# Patient Record
Sex: FEMALE | Race: WHITE | NOT HISPANIC OR LATINO | Employment: UNEMPLOYED | URBAN - METROPOLITAN AREA
[De-identification: names, ages, dates, MRNs, and addresses within clinical notes are randomized per-mention and may not be internally consistent; named-entity substitution may affect disease eponyms.]

---

## 2023-06-21 DIAGNOSIS — N94.6 DYSMENORRHEA: ICD-10-CM

## 2023-06-21 DIAGNOSIS — N93.9 ABNORMAL UTERINE BLEEDING (AUB): ICD-10-CM

## 2023-06-21 DIAGNOSIS — Z87.42 HISTORY OF ENDOMETRIOSIS: Primary | ICD-10-CM

## 2023-06-29 DIAGNOSIS — R31.9 HEMATURIA, UNSPECIFIED TYPE: Primary | ICD-10-CM

## 2023-07-12 DIAGNOSIS — K92.1 HEMATOCHEZIA: Primary | ICD-10-CM

## 2023-07-12 DIAGNOSIS — R19.7 DIARRHEA, UNSPECIFIED TYPE: ICD-10-CM

## 2023-07-26 ENCOUNTER — OFFICE VISIT (OUTPATIENT)
Dept: UROLOGY | Facility: CLINIC | Age: 43
End: 2023-07-26
Payer: MEDICAID

## 2023-07-26 VITALS
HEIGHT: 67 IN | TEMPERATURE: 98 F | RESPIRATION RATE: 18 BRPM | BODY MASS INDEX: 35.88 KG/M2 | DIASTOLIC BLOOD PRESSURE: 80 MMHG | HEART RATE: 73 BPM | WEIGHT: 228.63 LBS | SYSTOLIC BLOOD PRESSURE: 117 MMHG | OXYGEN SATURATION: 97 %

## 2023-07-26 DIAGNOSIS — R31.21 ASYMPTOMATIC MICROSCOPIC HEMATURIA: Primary | ICD-10-CM

## 2023-07-26 DIAGNOSIS — N39.3 STRESS INCONTINENCE: ICD-10-CM

## 2023-07-26 DIAGNOSIS — Z87.442 HISTORY OF KIDNEY STONES: ICD-10-CM

## 2023-07-26 DIAGNOSIS — N39.41 URGE INCONTINENCE: ICD-10-CM

## 2023-07-26 LAB
BILIRUB SERPL-MCNC: NORMAL MG/DL
BLOOD URINE, POC: NORMAL
COLOR, POC UA: YELLOW
GLUCOSE UR QL STRIP: NORMAL
KETONES UR QL STRIP: NORMAL
LEUKOCYTE ESTERASE URINE, POC: NORMAL
NITRITE, POC UA: NORMAL
PH, POC UA: 7
POC RESIDUAL URINE VOLUME: 35 ML (ref 0–100)
PROTEIN, POC: NORMAL
SPECIFIC GRAVITY, POC UA: 1.01
UROBILINOGEN, POC UA: 0.2

## 2023-07-26 PROCEDURE — 1159F MED LIST DOCD IN RCRD: CPT | Mod: CPTII,,, | Performed by: UROLOGY

## 2023-07-26 PROCEDURE — 3008F BODY MASS INDEX DOCD: CPT | Mod: CPTII,,, | Performed by: UROLOGY

## 2023-07-26 PROCEDURE — 99204 OFFICE O/P NEW MOD 45 MIN: CPT | Mod: S$PBB,,, | Performed by: UROLOGY

## 2023-07-26 PROCEDURE — 3079F PR MOST RECENT DIASTOLIC BLOOD PRESSURE 80-89 MM HG: ICD-10-PCS | Mod: CPTII,,, | Performed by: UROLOGY

## 2023-07-26 PROCEDURE — 99214 OFFICE O/P EST MOD 30 MIN: CPT | Mod: PBBFAC | Performed by: UROLOGY

## 2023-07-26 PROCEDURE — 3074F SYST BP LT 130 MM HG: CPT | Mod: CPTII,,, | Performed by: UROLOGY

## 2023-07-26 PROCEDURE — 51798 US URINE CAPACITY MEASURE: CPT | Mod: PBBFAC | Performed by: UROLOGY

## 2023-07-26 PROCEDURE — 3074F PR MOST RECENT SYSTOLIC BLOOD PRESSURE < 130 MM HG: ICD-10-PCS | Mod: CPTII,,, | Performed by: UROLOGY

## 2023-07-26 PROCEDURE — 3079F DIAST BP 80-89 MM HG: CPT | Mod: CPTII,,, | Performed by: UROLOGY

## 2023-07-26 PROCEDURE — 3008F PR BODY MASS INDEX (BMI) DOCUMENTED: ICD-10-PCS | Mod: CPTII,,, | Performed by: UROLOGY

## 2023-07-26 PROCEDURE — 81001 URINALYSIS AUTO W/SCOPE: CPT | Mod: PBBFAC | Performed by: UROLOGY

## 2023-07-26 PROCEDURE — 1159F PR MEDICATION LIST DOCUMENTED IN MEDICAL RECORD: ICD-10-PCS | Mod: CPTII,,, | Performed by: UROLOGY

## 2023-07-26 PROCEDURE — 1160F RVW MEDS BY RX/DR IN RCRD: CPT | Mod: CPTII,,, | Performed by: UROLOGY

## 2023-07-26 PROCEDURE — 1160F PR REVIEW ALL MEDS BY PRESCRIBER/CLIN PHARMACIST DOCUMENTED: ICD-10-PCS | Mod: CPTII,,, | Performed by: UROLOGY

## 2023-07-26 PROCEDURE — 99204 PR OFFICE/OUTPT VISIT, NEW, LEVL IV, 45-59 MIN: ICD-10-PCS | Mod: S$PBB,,, | Performed by: UROLOGY

## 2023-07-26 RX ORDER — PANTOPRAZOLE SODIUM 40 MG/1
40 TABLET, DELAYED RELEASE ORAL EVERY MORNING
COMMUNITY
Start: 2023-06-28 | End: 2024-03-13 | Stop reason: SDUPTHER

## 2023-07-26 RX ORDER — OXYBUTYNIN CHLORIDE 10 MG/1
10 TABLET, EXTENDED RELEASE ORAL DAILY
Qty: 90 TABLET | Refills: 3 | Status: SHIPPED | OUTPATIENT
Start: 2023-07-26 | End: 2024-03-06 | Stop reason: SDUPTHER

## 2023-07-26 NOTE — PROGRESS NOTES
CC:  Hematuria    HPI:  Joan Frias is a 42 y.o. female seen in consultation for hematuria.  She states she has brown urine.  She has smoked 1 1/2 to 2 packs per day for 20 years.    She also has low back pain.  She did have one episode of kidney stones when she was in her 20s which she passed on her own.  She complains of stress urinary incontinence with sneeze, laugh, cough or even walking fast.  She also has intermittent episodes of urge incontinence.    Bladder scan residual:  35 ml    Urinalysis:  Results for orders placed or performed in visit on 07/26/23   POCT URINE DIPSTICK WITH MICROSCOPE, AUTOMATED   Result Value Ref Range    Color, UA Yellow     Spec Grav UA 1.010     pH, UA 7.0     WBC, UA neg     Nitrite, UA neg     Protein, POC neg     Glucose, UA neg     Ketones, UA neg     Urobilinogen, UA 0.2     Bilirubin, POC neg     Blood, UA neg      Microscopic:  Negative      Lab Results:  Urine culture - 8 June 2023: No growth     Data Review:  Note from referring provider, Heladio Razo NP dated 27 June 2023.  Urine culture.     ROS:  All systems reviewed and are negative except as documented in HPI and/or Assessment and Plan.     Patient Active Problem List:     There is no problem list on file for this patient.       Past Medical History:  History reviewed. No pertinent past medical history.     Past Surgical History:  Past Surgical History:   Procedure Laterality Date    CYST REMOVAL          Family History:  History reviewed. No pertinent family history.     Social History:  Social History     Socioeconomic History    Marital status: Legally    Tobacco Use    Smoking status: Every Day     Current packs/day: 1.50     Types: Cigarettes    Smokeless tobacco: Never   Substance and Sexual Activity    Alcohol use: Not Currently    Drug use: Yes     Frequency: 7.0 times per week     Types: Marijuana     Comment: daily        Allergies:  Review of patient's allergies indicates:   Allergen Reactions     Pcn [penicillins] Swelling        Objective:  Vitals:    07/26/23 0848   BP: 117/80   Pulse: 73   Resp: 18   Temp: 98.1 °F (36.7 °C)     General:  Well developed, well nourished adult female in no acute distress  Abdomen: Soft, nontender, no masses  Extremities:  No clubbing, cyanosis, or edema  Neurologic:  Grossly intact  Musculoskeletal:  Normal tone    Assessment:  1. Asymptomatic microscopic hematuria  - Ambulatory referral/consult to Urology  - POCT URINE DIPSTICK WITH MICROSCOPE, AUTOMATED  - CT Abdomen Pelvis W Wo Contrast; Future    2. Stress incontinence    3. Urge incontinence  - POCT Bladder Scan  - oxybutynin (DITROPAN-XL) 10 MG 24 hr tablet; Take 1 tablet (10 mg total) by mouth once daily.  Dispense: 90 tablet; Refill: 3    4. History of kidney stones     Plan:  She needs a workup for the hematuria with a CT scan followed by a cystoscopy.  We will observe the stress incontinence for now.  Address this further at a future visit.  She was given oxybutynin for the urge incontinence.  The CT scan will evaluate for recurrence of her kidney stones.    Follow-up:    After CT for a cystoscopy.

## 2023-07-26 NOTE — PROGRESS NOTES
Pt seen by Dr. Price; CT ordered & pt given centralized scheduling information sheet; Pt instructed to return to clinic for cystoscopy; Discharge paperwork given w/pt verbalizing understanding

## 2023-09-20 ENCOUNTER — OFFICE VISIT (OUTPATIENT)
Dept: GASTROENTEROLOGY | Facility: CLINIC | Age: 43
End: 2023-09-20
Payer: MEDICAID

## 2023-09-20 VITALS
DIASTOLIC BLOOD PRESSURE: 82 MMHG | OXYGEN SATURATION: 99 % | HEIGHT: 67 IN | WEIGHT: 226.19 LBS | RESPIRATION RATE: 16 BRPM | TEMPERATURE: 98 F | BODY MASS INDEX: 35.5 KG/M2 | SYSTOLIC BLOOD PRESSURE: 115 MMHG | HEART RATE: 76 BPM

## 2023-09-20 DIAGNOSIS — Z72.0 TOBACCO USER: ICD-10-CM

## 2023-09-20 DIAGNOSIS — R10.9 RIGHT SIDED ABDOMINAL PAIN: ICD-10-CM

## 2023-09-20 DIAGNOSIS — R19.7 DIARRHEA, UNSPECIFIED TYPE: ICD-10-CM

## 2023-09-20 DIAGNOSIS — K92.1 HEMATOCHEZIA: Primary | ICD-10-CM

## 2023-09-20 PROCEDURE — 99215 OFFICE O/P EST HI 40 MIN: CPT | Mod: PBBFAC | Performed by: NURSE PRACTITIONER

## 2023-09-20 PROCEDURE — 99204 PR OFFICE/OUTPT VISIT, NEW, LEVL IV, 45-59 MIN: ICD-10-PCS | Mod: S$PBB,,, | Performed by: NURSE PRACTITIONER

## 2023-09-20 PROCEDURE — 3074F PR MOST RECENT SYSTOLIC BLOOD PRESSURE < 130 MM HG: ICD-10-PCS | Mod: CPTII,,, | Performed by: NURSE PRACTITIONER

## 2023-09-20 PROCEDURE — 3008F PR BODY MASS INDEX (BMI) DOCUMENTED: ICD-10-PCS | Mod: CPTII,,, | Performed by: NURSE PRACTITIONER

## 2023-09-20 PROCEDURE — 1160F PR REVIEW ALL MEDS BY PRESCRIBER/CLIN PHARMACIST DOCUMENTED: ICD-10-PCS | Mod: CPTII,,, | Performed by: NURSE PRACTITIONER

## 2023-09-20 PROCEDURE — 3008F BODY MASS INDEX DOCD: CPT | Mod: CPTII,,, | Performed by: NURSE PRACTITIONER

## 2023-09-20 PROCEDURE — 3079F DIAST BP 80-89 MM HG: CPT | Mod: CPTII,,, | Performed by: NURSE PRACTITIONER

## 2023-09-20 PROCEDURE — 1160F RVW MEDS BY RX/DR IN RCRD: CPT | Mod: CPTII,,, | Performed by: NURSE PRACTITIONER

## 2023-09-20 PROCEDURE — 1159F PR MEDICATION LIST DOCUMENTED IN MEDICAL RECORD: ICD-10-PCS | Mod: CPTII,,, | Performed by: NURSE PRACTITIONER

## 2023-09-20 PROCEDURE — 3074F SYST BP LT 130 MM HG: CPT | Mod: CPTII,,, | Performed by: NURSE PRACTITIONER

## 2023-09-20 PROCEDURE — 1159F MED LIST DOCD IN RCRD: CPT | Mod: CPTII,,, | Performed by: NURSE PRACTITIONER

## 2023-09-20 PROCEDURE — 3079F PR MOST RECENT DIASTOLIC BLOOD PRESSURE 80-89 MM HG: ICD-10-PCS | Mod: CPTII,,, | Performed by: NURSE PRACTITIONER

## 2023-09-20 PROCEDURE — 99204 OFFICE O/P NEW MOD 45 MIN: CPT | Mod: S$PBB,,, | Performed by: NURSE PRACTITIONER

## 2023-09-20 RX ORDER — HYOSCYAMINE SULFATE 0.125 MG
125 TABLET ORAL EVERY 6 HOURS PRN
Qty: 30 TABLET | Refills: 2 | Status: SHIPPED | OUTPATIENT
Start: 2023-09-20 | End: 2023-09-21 | Stop reason: SDUPTHER

## 2023-09-20 RX ORDER — POLYETHYLENE GLYCOL 3350, SODIUM SULFATE, SODIUM CHLORIDE, POTASSIUM CHLORIDE, SODIUM ASCORBATE, AND ASCORBIC ACID 7.5-2.691G
2000 KIT ORAL ONCE
Qty: 1 KIT | Refills: 0 | Status: SHIPPED | OUTPATIENT
Start: 2023-09-20 | End: 2023-09-20

## 2023-09-20 NOTE — PROGRESS NOTES
Subjective:       Patient ID: Joan Frias is a 42 y.o. female.    Chief Complaint: Diarrhea (5 times or more daily), Abdominal Pain (Right side pain when having what she calls a flare up), and Hematochezia (With every BM during her flare up)    This 42-year-old  female with a history of tobacco use, endometriosis, and fibromyalgia is referred for hematochezia.  She presents accompanied by her mother.  She reports intermittent right-sided abdominal pain for the past 5-6 months described as burning and aching pain that is nonradiating.  The pain is worsened with eating rice, potatoes, Chinese food, and other spicy and acidic foods.  She denies specific relieving factors.  The pain will last hours to days before resolving.  She has associated nausea without vomiting.  She also has associated loose to watery stools up to 5-6 times per day with occasional red blood with bowel movements noted on the tissue after wiping and in the toilet bowl.  She reports eating small amounts of food and changing her diet over the past few months with decreased frequency of abdominal pain and rectal bleeding noted since that time.  She denies any specific changes in diet or medications prior to onset of symptoms.  She reports a longstanding history of intermittent hemorrhoidal flares over the past several years.  She also reports a longstanding history of acid reflux that has improved with pantoprazole 40 mg daily which was prescribed approximately 3 months ago.  Her appetite is decreased and she reports an unintentional weight loss of approximately 30 lb over the past 3-4 months.  She denies fever, chills, vomiting, hematemesis, odynophagia, dysphagia, early satiety, melena, fecal urgency, fecal incontinence, or pain with defecation.      Laboratory results July 3, 2023 revealed negative H pylori IgG antibody; unremarkable lipase and amylase; unremarkable CMP; unremarkable CBC; unremarkable urine culture; unremarkable TSH and T4;  unremarkable hemoglobin A1c.      She is following Urology and has a cystoscopy scheduled October 5, 2023 for reports of urge incontinence over the past few months.  CT scan abdomen and pelvis with contrast was ordered in July 2023 by urology but has not been scheduled per review of patient's chart.  She denies hematuria or vaginal bleeding.    She denies ever having an EGD or colonoscopy done.  She denies regular NSAID use or use of blood thinners.  She smokes 20-30 cigarettes daily and denies alcohol use.  She denies illicit drug use.  She denies a family history of colon polyps or colon cancer.  Her paternal uncle has a history of ulcerative colitis.      Review of patient's allergies indicates:   Allergen Reactions    Pcn [penicillins] Swelling     Past Medical History:   Diagnosis Date    Endometriosis, unspecified     Fibromyalgia      Past Surgical History:   Procedure Laterality Date    ABSCESS DRAINAGE  2019    CYST REMOVAL       Family History:   family history includes COPD in her paternal grandmother; Cystic fibrosis in her paternal grandmother; Diabetes in her paternal grandfather; Heart attack in her maternal grandfather and paternal grandfather; Hyperlipidemia in her mother; Hypertension in her mother; Ulcerative colitis in her paternal uncle.    Social History:    reports that she has been smoking cigarettes. She started smoking about 25 years ago. She has a 38.4 pack-year smoking history. She has never used smokeless tobacco. She reports that she does not currently use alcohol. She reports that she does not currently use drugs after having used the following drugs: Marijuana. Frequency: 7.00 times per week.    Review of Systems  Negative except as noted in the HPI.      Objective:      Physical Exam  Constitutional:       Appearance: Normal appearance.   HENT:      Head: Normocephalic.      Mouth/Throat:      Mouth: Mucous membranes are moist.   Eyes:      Extraocular Movements: Extraocular  movements intact.      Conjunctiva/sclera: Conjunctivae normal.      Pupils: Pupils are equal, round, and reactive to light.   Cardiovascular:      Rate and Rhythm: Normal rate and regular rhythm.      Pulses: Normal pulses.      Heart sounds: Normal heart sounds.   Pulmonary:      Effort: Pulmonary effort is normal.      Breath sounds: Normal breath sounds.   Abdominal:      General: Bowel sounds are normal.      Palpations: Abdomen is soft.      Comments: Moderate tenderness noted along right side of abdominal region with deep palpation, mild guarding noted, no rebound tenderness noted   Musculoskeletal:         General: Normal range of motion.      Cervical back: Normal range of motion and neck supple.   Skin:     General: Skin is warm and dry.   Neurological:      General: No focal deficit present.      Mental Status: She is alert and oriented to person, place, and time.   Psychiatric:         Mood and Affect: Mood normal.         Behavior: Behavior normal.         Thought Content: Thought content normal.         Judgment: Judgment normal.         Home Medications:     Current Outpatient Medications   Medication Sig    pantoprazole (PROTONIX) 40 MG tablet Take 40 mg by mouth every morning.    oxybutynin (DITROPAN-XL) 10 MG 24 hr tablet Take 1 tablet (10 mg total) by mouth once daily. (Patient not taking: Reported on 9/20/2023)     Assessment/Plan:     Problem List Items Addressed This Visit          GI    Hematochezia - Primary     Laboratory results July 3, 2023 revealed negative H pylori IgG antibody; unremarkable lipase and amylase; unremarkable CMP; unremarkable CBC; unremarkable urine culture; unremarkable TSH and T4; unremarkable hemoglobin A1c.    She has a cystoscopy scheduled October 5, 2023 for reports of urge incontinence over the past few months.  Stool studies  CT scan abdomen and pelvis with contrast  Colonoscopy   Levsin as directed   GERD lifestyle modifications  Reflux precautions  Avoid NSAID  use  Will consider EGD with worsening symptoms of acid reflux  Recommend tobacco cessation  Continue pantoprazole 40 mg daily  ER precautions provided  Call with updates         Relevant Medications    polyethylene glycol (MOVIPREP) 100-7.5-2.691 gram solution    hyoscyamine (ANASPAZ,LEVSIN) 0.125 mg Tab    Other Relevant Orders    CT Abdomen Pelvis With Contrast    Case Request Endoscopy: COLONOSCOPY (Completed)    GIARDIA/CRYPTOSPORIDIUM ANTIGEN    Pancreatic elastase, fecal    Stool Culture    Stool Exam-Ova,Cysts,Parasites    Clostridium Diff Toxin, A & B, EIA    FECAL LEUKOCYTES - LACTOFERRIN ON  STOOL    Diarrhea     See above         Relevant Medications    polyethylene glycol (MOVIPREP) 100-7.5-2.691 gram solution    hyoscyamine (ANASPAZ,LEVSIN) 0.125 mg Tab    Other Relevant Orders    CT Abdomen Pelvis With Contrast    Case Request Endoscopy: COLONOSCOPY (Completed)    GIARDIA/CRYPTOSPORIDIUM ANTIGEN    Pancreatic elastase, fecal    Stool Culture    Stool Exam-Ova,Cysts,Parasites    Clostridium Diff Toxin, A & B, EIA    FECAL LEUKOCYTES - LACTOFERRIN ON  STOOL    Right sided abdominal pain     See above         Relevant Medications    polyethylene glycol (MOVIPREP) 100-7.5-2.691 gram solution    hyoscyamine (ANASPAZ,LEVSIN) 0.125 mg Tab    Other Relevant Orders    CT Abdomen Pelvis With Contrast    Case Request Endoscopy: COLONOSCOPY (Completed)    GIARDIA/CRYPTOSPORIDIUM ANTIGEN    Pancreatic elastase, fecal    Stool Culture    Stool Exam-Ova,Cysts,Parasites    Clostridium Diff Toxin, A & B, EIA    FECAL LEUKOCYTES - LACTOFERRIN ON  STOOL       Other    Tobacco user     Recommend tobacco cessation.         Relevant Orders    Ambulatory referral/consult to Smoking Cessation Program

## 2023-09-20 NOTE — ASSESSMENT & PLAN NOTE
Laboratory results July 3, 2023 revealed negative H pylori IgG antibody; unremarkable lipase and amylase; unremarkable CMP; unremarkable CBC; unremarkable urine culture; unremarkable TSH and T4; unremarkable hemoglobin A1c.    She has a cystoscopy scheduled October 5, 2023 for reports of urge incontinence over the past few months.  Stool studies  CT scan abdomen and pelvis with contrast  Colonoscopy   Levsin as directed   GERD lifestyle modifications  Reflux precautions  Avoid NSAID use  Will consider EGD with worsening symptoms of acid reflux  Recommend tobacco cessation  Continue pantoprazole 40 mg daily  ER precautions provided  Call with updates

## 2023-09-21 DIAGNOSIS — K92.1 HEMATOCHEZIA: ICD-10-CM

## 2023-09-21 DIAGNOSIS — R19.7 DIARRHEA, UNSPECIFIED TYPE: ICD-10-CM

## 2023-09-21 DIAGNOSIS — R10.9 RIGHT SIDED ABDOMINAL PAIN: ICD-10-CM

## 2023-09-21 RX ORDER — HYOSCYAMINE SULFATE 0.125 MG
125 TABLET ORAL EVERY 6 HOURS PRN
Qty: 30 TABLET | Refills: 2 | Status: SHIPPED | OUTPATIENT
Start: 2023-09-21 | End: 2024-03-06 | Stop reason: SDUPTHER

## 2023-09-21 NOTE — TELEPHONE ENCOUNTER
----- Message from Saminamaco Conner sent at 9/21/2023  1:27 PM CDT -----  Regarding: Send rx to [address]  Pt called and stated her rx hyoscyamine (ANASPAZ,LEVSIN) 0.125 mg Tab was to be sent to [address] not [facility]. Pt ask that we sent to the correct pharmacy as she lives in [place].          Thank You  Deanna STANTON

## 2023-09-27 ENCOUNTER — TELEPHONE (OUTPATIENT)
Dept: GASTROENTEROLOGY | Facility: CLINIC | Age: 43
End: 2023-09-27
Payer: MEDICAID

## 2023-09-27 DIAGNOSIS — Z00.00 WELLNESS EXAMINATION: Primary | ICD-10-CM

## 2023-09-27 NOTE — TELEPHONE ENCOUNTER
Pt notified of results, verbalized understanding. Pt requesting a referral to IM to establish PCP to further evaluate lumbar issues. Referral entered.    ----- Message from LISE Dyer sent at 9/27/2023  2:51 PM CDT -----  Please notify findings of mild hepatomegaly and diffuse hepatic steatosis with lower lumbar spine degenerative disease and no other abnormality noted.

## 2023-09-28 ENCOUNTER — PATIENT MESSAGE (OUTPATIENT)
Dept: GASTROENTEROLOGY | Facility: CLINIC | Age: 43
End: 2023-09-28
Payer: MEDICAID

## 2023-10-05 ENCOUNTER — PROCEDURE VISIT (OUTPATIENT)
Dept: UROLOGY | Facility: CLINIC | Age: 43
End: 2023-10-05
Payer: MEDICAID

## 2023-10-05 VITALS
SYSTOLIC BLOOD PRESSURE: 108 MMHG | BODY MASS INDEX: 35.78 KG/M2 | OXYGEN SATURATION: 99 % | HEIGHT: 66 IN | WEIGHT: 222.63 LBS | DIASTOLIC BLOOD PRESSURE: 71 MMHG | HEART RATE: 78 BPM | TEMPERATURE: 98 F

## 2023-10-05 DIAGNOSIS — N39.41 URGE INCONTINENCE: ICD-10-CM

## 2023-10-05 DIAGNOSIS — R31.21 ASYMPTOMATIC MICROSCOPIC HEMATURIA: Primary | ICD-10-CM

## 2023-10-05 DIAGNOSIS — Z87.442 HISTORY OF KIDNEY STONES: ICD-10-CM

## 2023-10-05 LAB
BILIRUB SERPL-MCNC: NORMAL MG/DL
BLOOD URINE, POC: NORMAL
COLOR, POC UA: YELLOW
GLUCOSE UR QL STRIP: NORMAL
KETONES UR QL STRIP: NORMAL
LEUKOCYTE ESTERASE URINE, POC: NORMAL
NITRITE, POC UA: NORMAL
PH, POC UA: 7.5
PROTEIN, POC: NORMAL
SPECIFIC GRAVITY, POC UA: 1.02
UROBILINOGEN, POC UA: 0.2

## 2023-10-05 PROCEDURE — 52000 CYSTOURETHROSCOPY: CPT | Mod: S$PBB,,, | Performed by: UROLOGY

## 2023-10-05 PROCEDURE — 99213 OFFICE O/P EST LOW 20 MIN: CPT | Mod: 25,S$PBB,, | Performed by: UROLOGY

## 2023-10-05 PROCEDURE — 81001 URINALYSIS AUTO W/SCOPE: CPT | Mod: PBBFAC | Performed by: UROLOGY

## 2023-10-05 PROCEDURE — 87186 SC STD MICRODIL/AGAR DIL: CPT | Performed by: UROLOGY

## 2023-10-05 PROCEDURE — 99213 PR OFFICE/OUTPT VISIT, EST, LEVL III, 20-29 MIN: ICD-10-PCS | Mod: 25,S$PBB,, | Performed by: UROLOGY

## 2023-10-05 PROCEDURE — 52000 PR CYSTOURETHROSCOPY: ICD-10-PCS | Mod: S$PBB,,, | Performed by: UROLOGY

## 2023-10-05 PROCEDURE — 52000 CYSTOURETHROSCOPY: CPT | Mod: PBBFAC | Performed by: UROLOGY

## 2023-10-05 PROCEDURE — 87088 URINE BACTERIA CULTURE: CPT | Performed by: UROLOGY

## 2023-10-05 RX ORDER — CIPROFLOXACIN 500 MG/1
500 TABLET ORAL
Status: COMPLETED | OUTPATIENT
Start: 2023-10-05 | End: 2023-10-05

## 2023-10-05 RX ORDER — LIDOCAINE HYDROCHLORIDE 20 MG/ML
JELLY TOPICAL
Status: COMPLETED | OUTPATIENT
Start: 2023-10-05 | End: 2023-10-05

## 2023-10-05 RX ADMIN — CIPROFLOXACIN 500 MG: 500 TABLET ORAL at 09:10

## 2023-10-05 RX ADMIN — LIDOCAINE HYDROCHLORIDE: 20 JELLY TOPICAL at 09:10

## 2023-10-05 NOTE — PROCEDURES
CC:  Cystoscopy    HPI:  Joan Frias is a 42 y.o. female here for cystoscopy for hematuria.  She has a history of microscopic hematuria and also states that she is seen brown urine.  She has a smoking history as well.  She has some low back pain and has a history of one episode of kidney stones in the past.  She has urge incontinence.  She was given oxybutynin at the last visit however she said the pharmacy said they had never gotten it so she has not started on that.    Urinalysis:  Results for orders placed or performed in visit on 10/05/23   POCT URINE DIPSTICK WITH MICROSCOPE, AUTOMATED   Result Value Ref Range    Color, UA Yellow     Spec Grav UA 1.020     pH, UA 7.5     WBC, UA trace     Nitrite, UA pos     Protein, POC neg     Glucose, UA neg     Ketones, UA neg     Urobilinogen, UA 0.2     Bilirubin, POC neg     Blood, UA neg      Microscopic:  few bacteria, 2-3 squamous epithelial cells per HPF        Imaging:  CT - 27 September 2023:  Urinary tract is negative.  Lower lumbar spine degenerative disease.      ROS:  All systems reviewed and are negative except as documented in HPI and/or Assessment and Plan.     Patient Active Problem List:     Patient Active Problem List   Diagnosis    Hematochezia    Diarrhea    Right sided abdominal pain    Tobacco user        Past Medical History:  Past Medical History:   Diagnosis Date    Degenerative lumbar disc     Endometriosis, unspecified     Fibromyalgia         Past Surgical History:  Past Surgical History:   Procedure Laterality Date    ABSCESS DRAINAGE  2019    CYST REMOVAL          Family History:  Family History   Problem Relation Age of Onset    Hypertension Mother     Hyperlipidemia Mother     Ulcerative colitis Paternal Uncle     Heart attack Maternal Grandfather     Cystic fibrosis Paternal Grandmother     COPD Paternal Grandmother     Heart attack Paternal Grandfather     Diabetes Paternal Grandfather         Social History:  Social History      Socioeconomic History    Marital status: Legally    Tobacco Use    Smoking status: Every Day     Current packs/day: 1.50     Average packs/day: 1.5 packs/day for 25.7 years (38.5 ttl pk-yrs)     Types: Cigarettes     Start date: 2/1/1998    Smokeless tobacco: Never   Substance and Sexual Activity    Alcohol use: Not Currently    Drug use: Not Currently     Frequency: 7.0 times per week     Types: Marijuana     Comment: daily    Sexual activity: Yes     Partners: Male     Birth control/protection: None        Allergies:  Review of patient's allergies indicates:   Allergen Reactions    Pcn [penicillins] Swelling        Objective:  Vitals:    10/05/23 0847   BP: 108/71   Pulse: 78   Temp: 98.1 °F (36.7 °C)     General:  Well developed, well nourished adult female in no acute distress  Abdomen: Soft, nontender, no masses  Extremities:  No clubbing, cyanosis, or edema  Neurologic:  Grossly intact  Musculoskeletal:  Normal tone  :  External genitalia is normal without lesions.  Vagina is normal.      Cystoscopy:        - Urethral meatus:  No strictures        - Urethra:  Normal without strictures or lesions        - Bladder neck:  Normal        - Bladder:  No mucosal abnormalities        - Ureteral orifices:  On the trigone with clear efflux bilaterally    The patient tolerated the procedure well without complications.  She was given Cipro 500mg, one tablet in the clinic.   The urethra was anesthetized with 2% Lidocaine Jelly, Urojet.      Assessment:  1. Asymptomatic microscopic hematuria  - POCT URINE DIPSTICK WITH MICROSCOPE, AUTOMATED  - Urine culture    2. Urge incontinence  - mirabegron (MYRBETRIQ) 50 mg Tb24; Take 1 tablet (50 mg total) by mouth once daily.  Dispense: 90 tablet; Refill: 3    3. History of kidney stones     Plan:  Follow the microscopic hematuria with a cytology in six months.  Begin Myrbetriq.  This was sent to the pharmacy.  She will call if they do not get this.  She does not  currently have any kidney stones.    Follow-up:    Six months for cytology.

## 2023-10-05 NOTE — PROGRESS NOTES
Patient education on cystoscopy procedure with consents obtained .  One time medication of cipro and lidocaine administered with no complaints . Procedure done with sterile technique and tolerated well.Discharged instructions verbalized by  patient RTC 6mnths

## 2023-10-07 LAB — BACTERIA UR CULT: ABNORMAL

## 2023-10-08 ENCOUNTER — TELEPHONE (OUTPATIENT)
Dept: UROLOGY | Facility: CLINIC | Age: 43
End: 2023-10-08
Payer: MEDICAID

## 2023-10-08 RX ORDER — NITROFURANTOIN 25; 75 MG/1; MG/1
100 CAPSULE ORAL 2 TIMES DAILY
Qty: 14 CAPSULE | Refills: 0 | Status: SHIPPED | OUTPATIENT
Start: 2023-10-08 | End: 2024-03-06 | Stop reason: SDUPTHER

## 2023-10-08 NOTE — TELEPHONE ENCOUNTER
A urine culture was sent from her recent visit and grew out E coli.  I do not think she was aware was sending culture however she does need treatment if this infection.  I have sent Macrobid to the pharmacy.  Please inform her of this result and the prescription.

## 2023-10-20 ENCOUNTER — PATIENT MESSAGE (OUTPATIENT)
Dept: GASTROENTEROLOGY | Facility: CLINIC | Age: 43
End: 2023-10-20
Payer: MEDICAID

## 2023-11-02 ENCOUNTER — OFFICE VISIT (OUTPATIENT)
Dept: INTERNAL MEDICINE | Facility: CLINIC | Age: 43
End: 2023-11-02
Payer: MEDICAID

## 2023-11-02 VITALS
RESPIRATION RATE: 16 BRPM | BODY MASS INDEX: 35.68 KG/M2 | HEIGHT: 66 IN | HEART RATE: 76 BPM | SYSTOLIC BLOOD PRESSURE: 121 MMHG | WEIGHT: 222 LBS | TEMPERATURE: 98 F | DIASTOLIC BLOOD PRESSURE: 84 MMHG | OXYGEN SATURATION: 100 %

## 2023-11-02 DIAGNOSIS — G89.29 CHRONIC MIDLINE LOW BACK PAIN WITHOUT SCIATICA: ICD-10-CM

## 2023-11-02 DIAGNOSIS — E66.9 OBESITY, CLASS II, BMI 35-39.9: ICD-10-CM

## 2023-11-02 DIAGNOSIS — Z72.0 TOBACCO USER: Primary | ICD-10-CM

## 2023-11-02 DIAGNOSIS — M54.50 CHRONIC MIDLINE LOW BACK PAIN WITHOUT SCIATICA: ICD-10-CM

## 2023-11-02 DIAGNOSIS — Z00.00 HEALTHCARE MAINTENANCE: ICD-10-CM

## 2023-11-02 DIAGNOSIS — M79.7 FIBROMYALGIA: Chronic | ICD-10-CM

## 2023-11-02 DIAGNOSIS — K92.1 HEMATOCHEZIA: ICD-10-CM

## 2023-11-02 DIAGNOSIS — R19.7 DIARRHEA, UNSPECIFIED TYPE: ICD-10-CM

## 2023-11-02 DIAGNOSIS — R10.9 RIGHT SIDED ABDOMINAL PAIN: ICD-10-CM

## 2023-11-02 DIAGNOSIS — Z00.00 WELLNESS EXAMINATION: ICD-10-CM

## 2023-11-02 PROCEDURE — 99215 OFFICE O/P EST HI 40 MIN: CPT | Mod: PBBFAC | Performed by: STUDENT IN AN ORGANIZED HEALTH CARE EDUCATION/TRAINING PROGRAM

## 2023-11-02 NOTE — PROGRESS NOTES
[facility] Visit    Chief Complaint:      Establish Care (Pt here to establish care. )     Subjective:     HPI:  Joan Frias is a 43 y.o. female with a endometriosis, fibromyalgia, class II obesity, chronic lower back pain, longtime smoker 1 PPD w/25 pack year history, daily marijuana use here to establish care. She see's [facility] for hematochezia and loose stools x8 months associated with right sided abdominal pain that is fairly constant in nature; hematochezia has resolved changing diet to bland foot mostly protein and vegetables, limiting bread/carb intake but not necessarily gluten. States she's had this pain for a while, had US of her gallbladder at old provider which was normal. Denies fever, chills, vomiting, hematemesis, odynophagia, dysphagia, early satiety, melena, fecal urgency, fecal incontinence, or pain with defecation.  CT abdomen showed no gallbladder/pancreas/intestinal disease, denoted hepatic steatosis and mild hepatomegaly; otherwise normal. Awaiting stool sample for w/u.     She Also sees urology for urinary frequency and bladder spasm, started on Myrbetriq which has helped substantially but still urinates once per hour due to drinking plenty of water, wakes up to urinate 2x at night. Treated for UTI with Macrobid, completed regimen. Her chronic lower back pain is worse with movement, does not radiate, no weakness, numbness or tingling in lower extremities. No other complaints at this time.     PMH: endometriosis, fibromyalgia, obesity, lower back pain, smoker  PSHx: n/a  Family Hx: no significant hx  Social: Smoke 1 PPD w/ 25 pack year hx, no ETOH, marijuana daily        Review of Systems  Constitutional: no fever, +fatigue, no weakness  Eye: no vision loss, eye redness, drainage, or pain  ENMT: no sore throat, ear pain, sinus pain/congestion, nasal congestion/drainage  Respiratory: no cough, no wheezing, no shortness of breath  Cardiovascular: no chest pain, no palpitations,  "no edema  Gastrointestinal: see HPI  Genitourinary: no dysuria, no urinary frequency or urgency, no hematuria  Hema/Lymph: no abnormal bruising or bleeding  Endocrine: no heat or cold intolerance, no excessive thirst or excessive urination  Musculoskeletal: see HPI  Integumentary: no skin rash or abnormal lesion  Neurologic: no headache, no dizziness, no weakness or numbness    Objective:   Last 24 Hour Vital Signs:  Vitals  BP: 121/84  Temp: 97.9 °F (36.6 °C)  Pulse: 76  Resp: 16  SpO2: 100 %  Height: 5' 6" (167.6 cm)  Weight: 100.7 kg (222 lb)    Physical Examination:    General: Well nourished, well developed in NAD  HEENT: PERRLA, NC/AT, MMM  Respiratory: CTAB, normal respiratory effort  Cardiovascular: RRR, no m/r/g  Gastrointestinal: S, TTP to RUQ and RLQ w/o guarding or rebound tenderness, ND, active BS, no masses palpable  Musculoskeletal: full range of motion of all extremities/spine without limitation or discomfort  Integumentary: no rashes or skin lesions present  Neurologic: AAOx4, CN II-XII grossly intact, normal gait    Labs  BMP: No results found for: "CHLORIDE", "CO2", "BUN", "CREATININE", "GLUCOSE", "CALCIUM"  CBC: No results found for: "WBC", "HGB", "HCT", "MCV", "RDW"  LFTs: No results found for: "LABPROT", "ALBUMIN", "BILIRUBINTOT", "AST", "ALT", "ALKPHOS", "GGT"  FLP: No results found for: "CHOL", "HDL", "LDL", "TRIG", "TOTALCHOLEST"  DM: No results found for: "HGBA1C", "EAG", "GLUF", "MICROALBUR", "LDLCALC", "CREATININE", "CREATRANDUR", "PROTEINURINE"  Thyroid: No results found for: "TSH", "IASJMC2NSKH", "W7ZJKLC", "K7NZPHG", "THYROIDAB"  Anemia: No results found for: "IRON", "TIBC", "FERRITIN", "[id number]", "FOLATE"          Assessment & Plan:     Urge incontinence  - Continue Myrbetriq and Ditropan per urology,    - Continue f/u with urology    GERD  -Symptoms improved on Protonix   -Continue Protonix 40 mg daily for now    RUQ/RLQ Abdominal Pain  Diarrhea  -Seen by GI, pending w/u with " stool studies  -Will add TTG and Vit D; concern for malabsorptive disease and pt states she's eating less bread/starch with some improvement of symptoms  -CT scan not concerning for acute abdominal findings  -CMP pending, if LE or bili elevated will order abd US gallbladder    Fibromyalgia  -Pt has reported hx of fibromyalgia  -Will try PT, if no improvement will consider duloxetine    Smoker  -Pt w/ 25 pack year hx, smoking 1 PPD  -No desire to quit at this time, educated on smoking cessation    Health Maintenance  -Vaccines:    -Pneumonia: refused    -Tdap: refused    -Flu: refused    -Covid: refused  -Colon Cancer Screen:   -Breast Cancer Screen: discussed with pt, prefer to start MMG at age 47  -Cervical Cancer Screen: appt pending with GYN  -Bone Density Screen: n/a  -HCV lifetime screen: ordered  -HIV lifetime screen: ordered      To be addressed at next follow-up  -labs, PT ref      Follow-ups  -Follow-up medicine clinic in 3-4 m      Lake Cummins MD  [employer] IM PGY III

## 2023-11-09 ENCOUNTER — TELEPHONE (OUTPATIENT)
Dept: INTERNAL MEDICINE | Facility: CLINIC | Age: 43
End: 2023-11-09

## 2024-03-06 DIAGNOSIS — R10.9 RIGHT SIDED ABDOMINAL PAIN: ICD-10-CM

## 2024-03-06 DIAGNOSIS — R19.7 DIARRHEA, UNSPECIFIED TYPE: ICD-10-CM

## 2024-03-06 DIAGNOSIS — N39.41 URGE INCONTINENCE: ICD-10-CM

## 2024-03-06 DIAGNOSIS — K92.1 HEMATOCHEZIA: ICD-10-CM

## 2024-03-06 RX ORDER — NITROFURANTOIN 25; 75 MG/1; MG/1
100 CAPSULE ORAL 2 TIMES DAILY
Qty: 14 CAPSULE | Refills: 0 | Status: SHIPPED | OUTPATIENT
Start: 2024-03-06 | End: 2024-05-14 | Stop reason: ALTCHOICE

## 2024-03-06 RX ORDER — MIRABEGRON 50 MG/1
50 TABLET, EXTENDED RELEASE ORAL DAILY
Qty: 90 TABLET | Refills: 1 | Status: SHIPPED | OUTPATIENT
Start: 2024-03-06 | End: 2024-03-13 | Stop reason: SDUPTHER

## 2024-03-06 RX ORDER — HYOSCYAMINE SULFATE 0.125 MG
125 TABLET ORAL EVERY 6 HOURS PRN
Qty: 30 TABLET | Refills: 2 | Status: SHIPPED | OUTPATIENT
Start: 2024-03-06

## 2024-03-06 RX ORDER — OXYBUTYNIN CHLORIDE 10 MG/1
10 TABLET, EXTENDED RELEASE ORAL DAILY
Qty: 90 TABLET | Refills: 1 | Status: SHIPPED | OUTPATIENT
Start: 2024-03-06 | End: 2024-06-11

## 2024-03-13 ENCOUNTER — OFFICE VISIT (OUTPATIENT)
Dept: GYNECOLOGY | Facility: CLINIC | Age: 44
End: 2024-03-13
Payer: MEDICAID

## 2024-03-13 VITALS
OXYGEN SATURATION: 98 % | TEMPERATURE: 98 F | WEIGHT: 227.63 LBS | SYSTOLIC BLOOD PRESSURE: 126 MMHG | HEART RATE: 79 BPM | BODY MASS INDEX: 36.58 KG/M2 | DIASTOLIC BLOOD PRESSURE: 78 MMHG | RESPIRATION RATE: 18 BRPM | HEIGHT: 66 IN

## 2024-03-13 DIAGNOSIS — N89.8 VAGINAL DISCHARGE: ICD-10-CM

## 2024-03-13 DIAGNOSIS — Z87.42 HISTORY OF ENDOMETRIOSIS: ICD-10-CM

## 2024-03-13 DIAGNOSIS — K21.9 GASTROESOPHAGEAL REFLUX DISEASE, UNSPECIFIED WHETHER ESOPHAGITIS PRESENT: Primary | ICD-10-CM

## 2024-03-13 DIAGNOSIS — N39.41 URGE INCONTINENCE: ICD-10-CM

## 2024-03-13 DIAGNOSIS — N94.6 DYSMENORRHEA: ICD-10-CM

## 2024-03-13 DIAGNOSIS — N80.9 ENDOMETRIOSIS: ICD-10-CM

## 2024-03-13 DIAGNOSIS — Z12.4 CERVICAL CANCER SCREENING: ICD-10-CM

## 2024-03-13 DIAGNOSIS — N39.46 URINARY INCONTINENCE, MIXED: ICD-10-CM

## 2024-03-13 DIAGNOSIS — N93.9 ABNORMAL UTERINE BLEEDING (AUB): ICD-10-CM

## 2024-03-13 DIAGNOSIS — R10.9 ABDOMINAL PAIN, UNSPECIFIED ABDOMINAL LOCATION: ICD-10-CM

## 2024-03-13 LAB
CLUE CELLS VAG QL WET PREP: NORMAL
T VAGINALIS VAG QL WET PREP: NORMAL
WBC #/AREA VAG WET PREP: NORMAL
YEAST SPEC QL WET PREP: NORMAL

## 2024-03-13 PROCEDURE — 99214 OFFICE O/P EST MOD 30 MIN: CPT | Mod: PBBFAC

## 2024-03-13 PROCEDURE — 88174 CYTOPATH C/V AUTO IN FLUID: CPT

## 2024-03-13 PROCEDURE — 99459 PELVIC EXAMINATION: CPT | Mod: PBBFAC

## 2024-03-13 PROCEDURE — 87624 HPV HI-RISK TYP POOLED RSLT: CPT

## 2024-03-13 PROCEDURE — 87210 SMEAR WET MOUNT SALINE/INK: CPT

## 2024-03-13 RX ORDER — PANTOPRAZOLE SODIUM 40 MG/1
40 TABLET, DELAYED RELEASE ORAL EVERY MORNING
Qty: 90 TABLET | Refills: 0 | Status: SHIPPED | OUTPATIENT
Start: 2024-03-13 | End: 2024-06-06 | Stop reason: SDUPTHER

## 2024-03-13 RX ORDER — NORETHINDRONE 0.35 MG/1
1 TABLET ORAL DAILY
Qty: 90 TABLET | Refills: 3 | Status: SHIPPED | OUTPATIENT
Start: 2024-03-13 | End: 2025-03-13

## 2024-03-13 RX ORDER — MIRABEGRON 50 MG/1
50 TABLET, EXTENDED RELEASE ORAL DAILY
Qty: 90 TABLET | Refills: 1 | Status: SHIPPED | OUTPATIENT
Start: 2024-03-13 | End: 2024-09-09

## 2024-03-13 NOTE — PROGRESS NOTES
[facility] GYNECOLOGY CLINIC NOTE    Joan Frias is a 43 y.o.  with PMHx of fibromyalgia, lumbar degenerative disc disease, and urinary frequency/urge incontinence who presents complaining of AUB and dysmenorrhea.    Patient reports heavy, painful menses since age 17. Reports she was diagnosed with endometriosis at that time based on strong clinical suspicion. Had postpartum BTL, has never had laparoscopic surgery. At one time was on OCPs with improvement in pain and bleeding symptoms. Not currently on any hormonal medication. LMP 2024.     Patient also with RLQ pain that has been present for last few months, separate sensation from her typical dysmenorrhea. Has also been experiencing hematochezia and diarrhea, undergoing workup with GI currently.      Patient follows with urology for management of urge incontinence, was started on Myrbetriq, currently out of medication. Also reports leakage of urine with coughing/sneezing.     OBHx: 3 vaginal deliveries, denies any trauma at time of delivery  GynHx: Menarche @ age 12, heavy menses, ~21-day cycles with ~9 consecutive days of bleeding Reports history of STI (cannot remember if it was chlamydia or gonorrhea) treated at age ~19. Reports history of multiple abnormal paps. HPV negative at [place] in . S/p BTL in .     Gynecology  OB History          3    Para   3    Term                AB        Living             SAB        IAB        Ectopic        Multiple        Live Births                    Past Medical History:   Diagnosis Date    Degenerative lumbar disc     Endometriosis, unspecified     Fibromyalgia     Mixed incontinence       Past Surgical History:   Procedure Laterality Date    ABSCESS DRAINAGE      CYST REMOVAL      TUBAL LIGATION        Current Outpatient Medications   Medication Instructions    (Magic mouthwash) 1:1:1 diphenhydrAMINE(Benadryl) 12.5mg/5ml liq, aluminum & magnesium hydroxide-simethicone (Maalox), LIDOcaine  "viscous 2% 15 mLs, Swish & Spit, Every 4 hours PRN, for mouth sores    hyoscyamine (ANASPAZ,LEVSIN) 125 mcg, Oral, Every 6 hours PRN    mirabegron (MYRBETRIQ) 50 mg, Oral, Daily    nitrofurantoin, macrocrystal-monohydrate, (MACROBID) 100 MG capsule 100 mg, Oral, 2 times daily    norethindrone (MICRONOR) 0.35 mg, Oral, Daily    oxybutynin (DITROPAN-XL) 10 mg, Oral, Daily    pantoprazole (PROTONIX) 40 mg, Oral, Every morning     Social History     Tobacco Use    Smoking status: Every Day     Current packs/day: 1.50     Average packs/day: 1.5 packs/day for 26.1 years (39.2 ttl pk-yrs)     Types: Cigarettes     Start date: 2/1/1998    Smokeless tobacco: Never   Substance Use Topics    Alcohol use: Not Currently    Drug use: Not Currently     Frequency: 7.0 times per week     Types: Marijuana     Comment: daily       Review of Systems  Pertinent items noted in HPI    Objective:     Vitals:    03/13/24 0829   BP: 126/78   Pulse: 79   Resp: 18   Temp: 97.8 °F (36.6 °C)   SpO2: 98%   Weight: 103.2 kg (227 lb 9.6 oz)   Height: 5' 6" (1.676 m)     Body mass index is 36.74 kg/m².    Physical Exam:   General: Alert and oriented, in no acute distress  Lungs: Breathing comfortably on room air,  no conversational dyspnea  Heart: Regular rate, extremities well perfused  Abdomen: Soft, non-distended, non tender to palpation, no involuntary guarding, no rebound tenderness  Extremities: Atraumatic, non-edematous, no cords or calf tenderness, no significant calf/ankle edema  External genitalia: Normal female genitalia without lesion, discharge or tenderness. Normal appearing urethral meatus. Normal appearing external anus.  Bimanual Exam: RLQ pain elicited with palpation of right piriformis and levator ani muscles. Nontender pelvic floor muscles on left. Uterus 9 cm in size, freely mobile, smooth in contour, no masses, nontender. No cervical motion tenderness. No adnexal fullness/tenderness.  Speculum Exam: Vaginal mucosa pink and " moist, without lesion. Moderate amount thin, white discharge present in vaginal canal. Cervix well visualized, smooth in contour with no masses or lesions. Os normal in appearance without blood or discharge.     Relevant Labs:   Lab Results   Component Value Date    WBC 8.22 2023    HGB 14.4 2023    HCT 45.3 2023    MCV 91.5 2023     2023       Assessment:       43 y.o.  here for AUB and dysmenorrhea.     1. Gastroesophageal reflux disease, unspecified whether esophagitis present  pantoprazole (PROTONIX) 40 MG tablet      2. History of endometriosis  Ambulatory referral/consult to Obstetrics / Gynecology      3. Dysmenorrhea  Ambulatory referral/consult to Obstetrics / Gynecology    US Pelvis Comp with Transvag NON-OB (xpd    norethindrone (MICRONOR) 0.35 mg tablet      4. Abnormal uterine bleeding (AUB)  Ambulatory referral/consult to Obstetrics / Gynecology    US Pelvis Comp with Transvag NON-OB (xpd    TSH    norethindrone (MICRONOR) 0.35 mg tablet      5. Urge incontinence  mirabegron (MYRBETRIQ) 50 mg Tb24      6. Cervical cancer screening  Liquid-Based Pap Smear, Screening Screening      7. Vaginal discharge  Wet Prep, Genital      8. Endometriosis        9. Urinary incontinence, mixed        10. Abdominal pain, unspecified abdominal location               Plan:         Problem List Items Addressed This Visit          Renal/    Abnormal uterine bleeding (AUB)     - Further investigate with TSH, TVUS  - Most recent H/H wnl, no signs or symptoms of anemia at this time         Relevant Medications    norethindrone (MICRONOR) 0.35 mg tablet    Other Relevant Orders    US Pelvis Comp with Transvag NON-OB (xpd    TSH (Completed)    Endometriosis     - Strong suspicion for endometriosis as cause of dysmenorrhea  - Continue PRN NSAIDs  - Start norethindrone, plan to reassess symptoms in 3-4 months         Urinary incontinence, mixed     - Following with Urology   - Refilled  Rx for Myrbetriq for urge symptoms         Cervical cancer screening    Relevant Orders    Liquid-Based Pap Smear, Screening Screening       GI    Abdominal pain     - Patient with RLQ pain distinct from dysmenorrhea  - Undergoing workup with GI  - Consider endometriosis as  cause of pain and hematochezia, will follow up pain s/p initiation of norethindrone and review GI work up           Other Visit Diagnoses       Gastroesophageal reflux disease, unspecified whether esophagitis present    -  Primary    Relevant Medications    pantoprazole (PROTONIX) 40 MG tablet    History of endometriosis        Dysmenorrhea        Relevant Medications    norethindrone (MICRONOR) 0.35 mg tablet    Other Relevant Orders    US Pelvis Comp with Transvag NON-OB (xpd    Urge incontinence        Relevant Medications    mirabegron (MYRBETRIQ) 50 mg Tb24    Vaginal discharge        Relevant Orders    Wet Prep, Genital (Completed)            Return to clinic in 4 months, plan to assess bleeding and pain symptoms since initiation of norethindrone, review TVUS    Discussed patient and plan with Dr. Tanya Blas MD  [facility], PGY-2

## 2024-03-13 NOTE — PROGRESS NOTES
[facility] OB[facility]GYN CLINIC NOTE  [facility]  [address] [place] [address]  Phone: [phone]  Fax: [phone]    Subjective:     Joan Frias is a 43 y.o.  with PMHx of fibromyalgia, lumbar degenerative disc disease, and urinary frequency/urge incontinence who presents complaining of AUB and dysmenorrhea.  Patient also persistent RLQ pain (being followed by urology & GI).    Allergies: Penicillin  OBHx: 3 vaginal deliveries  GynHx:   Menarche @ age 12, frequent heavy menses (~21-day cycles with ~9 consecutive days of bleeding)  LMP: 2024  Reports history of STI (cannot remember if it was chlamydia or gonorrhea) treated at age ~19. Reports history of multiple abnormal paps. HPV negative at [place] in .  Contraception: BTL (2009).    MedHx:  Past Medical History:   Diagnosis Date    Degenerative lumbar disc     Endometriosis, unspecified     Fibromyalgia        SurgHx: BTL (2009)  Past Surgical History:   Procedure Laterality Date    ABSCESS DRAINAGE  2019    CYST REMOVAL         Medications:    Current Outpatient Medications:     (Magic mouthwash) 1:1:1 diphenhydrAMINE(Benadryl) 12.5mg/5ml liq, aluminum & magnesium hydroxide-simethicone (Maalox), LIDOcaine viscous 2%, Swish and spit 15 mLs every 4 (four) hours as needed (mouth soreness). for mouth sores (Patient not taking: Reported on 3/13/2024), Disp: 480 mL, Rfl: 3    hyoscyamine (ANASPAZ,LEVSIN) 0.125 mg Tab, Take 1 tablet (125 mcg total) by mouth every 6 (six) hours as needed (abdominal cramps). (Patient not taking: Reported on 3/13/2024), Disp: 30 tablet, Rfl: 2    mirabegron (MYRBETRIQ) 50 mg Tb24, Take 1 tablet (50 mg total) by mouth once daily. (Patient not taking: Reported on 3/13/2024), Disp: 90 tablet, Rfl: 1    nitrofurantoin, macrocrystal-monohydrate, (MACROBID) 100 MG capsule, Take 1 capsule (100 mg total) by mouth 2 (two) times daily. (Patient not taking: Reported on 3/13/2024), Disp: 14 capsule, Rfl: 0    oxybutynin (DITROPAN-XL) 10  "MG 24 hr tablet, Take 1 tablet (10 mg total) by mouth once daily. (Patient not taking: Reported on 3/13/2024), Disp: 90 tablet, Rfl: 1    pantoprazole (PROTONIX) 40 MG tablet, Take 40 mg by mouth every morning., Disp: , Rfl:     FM Hx: Denies hx of ovarian or breast cancer. Denies history of bleeding or coagulation disorders.  Reports history of endometrial cancer in sister at age ~39 (sister is still living s/p hysterectomy). Also reports Hx of endometriosis in paternal grandmother & paternal aunt. Reports Hx of colon cancer in paternal uncle & maternal uncle.  Family History   Problem Relation Age of Onset    Hypertension Mother     Hyperlipidemia Mother     Ulcerative colitis Paternal Uncle     Heart attack Maternal Grandfather     Cystic fibrosis Paternal Grandmother     COPD Paternal Grandmother     Heart attack Paternal Grandfather     Diabetes Paternal Grandfather        Social Hx: Denies alcohol usage. Reports daily marijuana use, denies usage of any other illicit drugs.  Social History     Socioeconomic History    Marital status: Legally    Tobacco Use    Smoking status: Every Day     Current packs/day: 1.50     Average packs/day: 1.5 packs/day for 26.1 years (39.2 ttl pk-yrs)     Types: Cigarettes     Start date: 2/1/1998    Smokeless tobacco: Never   Substance and Sexual Activity    Alcohol use: Not Currently    Drug use: Not Currently     Frequency: 7.0 times per week     Types: Marijuana     Comment: daily    Sexual activity: Yes     Partners: Male     Birth control/protection: None       Review of Systems  Denies fevers, chills, headache, vomiting, or syncope. Reports history of blurry vision, nausea, and dizziness occurring only during menses.  Denies chest pain, shortness of breath, RUQ pain, or calf pain.    Objective:     Vitals:    03/13/24 0829   BP: 126/78   Pulse: 79   Resp: 18   Temp: 97.8 °F (36.6 °C)   SpO2: 98%   Weight: 103.2 kg (227 lb 9.6 oz)   Height: 5' 6" (1.676 m)     Body " mass index is 36.74 kg/m².    Physical Exam:    General: alert and oriented, in no acute distress  Lungs: clear to auscultation bilaterally, no conversational dyspnea  Heart: regular rate and rhythm  Abdomen: Soft, non-distended, non tender*** to palpation, no involuntary guarding, no rebound tenderness  Extremities: Normal, atraumatic, non-edematous, No cords or calf tenderness, No significant calf/ankle edema  External genitalia: Normal female genitalia without lesion, discharge or tenderness.*** Normal appearing urethral meatus***. Normal appearing external anus.***  Bimanual Exam: No pelvic lymphadenopathy noted bilaterally. Vagina with adequate capacity***. Uterus ***cm in size, no cervical motion tenderness. Smooth in contour, no masses. Good descent, mobile. ***. No adnexal fullness/tenderness. Normal urethra. Normal bladder  Speculum Exam: Vaginal mucosa normal in appearance. Pink. No masses/lesions. Cervix well visualized, smooth in contour no masses or lesions. Os normal in appearance, no blood or discharge coming from the os    Note: RN chaperone present for entirety of exam.     Imaging  No images are attached to the encounter.  Assessment/Plan:    Joan Frias is a 43 y.o.  with AUB and painful menses.    Health maintenance  Pap: UTD (3/13/2024) results pending. HPV negative 2023 ([facility]).  Mammogram: none.  Colonoscopy: N/A.  Bone density: N/A.  Transvaginal ultrasound.  3-month trial of continuous progestin-only OCPs.  RTC in 4 months.    Future Appointments   Date Time Provider Department Center   2024  9:00 AM Светлана Price DO [facility] UROLO [facility]   2024  9:10 AM Lake Cummins MD [facility] IM RES [facility]        Patient and plan were discussed with Dr. Martínez.

## 2024-03-14 ENCOUNTER — HOSPITAL ENCOUNTER (OUTPATIENT)
Dept: RADIOLOGY | Facility: HOSPITAL | Age: 44
Discharge: HOME | End: 2024-03-14
Payer: MEDICAID

## 2024-03-14 DIAGNOSIS — N93.9 ABNORMAL UTERINE BLEEDING (AUB): ICD-10-CM

## 2024-03-14 DIAGNOSIS — N94.6 DYSMENORRHEA: ICD-10-CM

## 2024-03-14 PROCEDURE — 76830 TRANSVAGINAL US NON-OB: CPT | Mod: TC

## 2024-03-14 NOTE — ASSESSMENT & PLAN NOTE
- Strong suspicion for endometriosis as cause of dysmenorrhea  - Continue PRN NSAIDs  - Start norethindrone, plan to reassess symptoms in 3-4 months

## 2024-03-14 NOTE — ASSESSMENT & PLAN NOTE
- Patient with RLQ pain distinct from dysmenorrhea  - Undergoing workup with GI  - Consider endometriosis as  cause of pain and hematochezia, will follow up pain s/p initiation of norethindrone and review GI work up

## 2024-03-14 NOTE — ASSESSMENT & PLAN NOTE
- Further investigate with TSH, TVUS  - Most recent H/H wnl, no signs or symptoms of anemia at this time

## 2024-03-15 LAB — PSYCHE PATHOLOGY RESULT: NORMAL

## 2024-03-20 ENCOUNTER — TELEPHONE (OUTPATIENT)
Dept: GYNECOLOGY | Facility: CLINIC | Age: 44
End: 2024-03-20
Payer: MEDICAID

## 2024-03-20 DIAGNOSIS — N94.6 DYSMENORRHEA: ICD-10-CM

## 2024-03-20 DIAGNOSIS — N93.9 ABNORMAL UTERINE BLEEDING (AUB): ICD-10-CM

## 2024-03-20 NOTE — TELEPHONE ENCOUNTER
MD contacted patient after receiving message that patient had questions about ultrasound. Reviewed TVUS findings with patient including two ~1 cm fibroids, simple cyst of ovary, and appearance of possible adenomyosis. TSH also found to be wnl at 1.089. All questions answered. Patient reports she has started norethindrone. Plan to continue and be seen in clinic to reassess bleeding and pain symptoms in 4 months.     Marily Blas MD  [facility], PGY-2

## 2024-04-01 ENCOUNTER — ANESTHESIA EVENT (OUTPATIENT)
Dept: ENDOSCOPY | Facility: HOSPITAL | Age: 44
End: 2024-04-01
Payer: MEDICAID

## 2024-04-01 RX ORDER — LIDOCAINE HYDROCHLORIDE 10 MG/ML
1 INJECTION, SOLUTION EPIDURAL; INFILTRATION; INTRACAUDAL; PERINEURAL ONCE
Status: CANCELLED | OUTPATIENT
Start: 2024-04-01 | End: 2024-04-01

## 2024-04-01 NOTE — ANESTHESIA PREPROCEDURE EVALUATION
"                                                                                                             04/01/2024  Joan Frias is a 43 y.o., female with PMHx of obesity, smoking, THC use, GERD, chronic pain, fibromyalgia presents for colonoscopy secondary to hematochezia.    Vitals:    03/20/24 1125 04/03/24 0912   BP:  (!) 120/94   BP Location:  Right arm   Patient Position:  Lying   Pulse:  72   Resp:  (!) 22   Temp:  36.4 °C (97.6 °F)   TempSrc:  Oral   SpO2:  97%   Weight:  101.6 kg (224 lb)   Height: 5' 6" (1.676 m) 5' 6" (1.676 m)         NO BETA BLOCKER USE    Active Ambulatory Problems     Diagnosis Date Noted    Hematochezia 09/20/2023    Diarrhea 09/20/2023    Abdominal pain 09/20/2023    Tobacco user 09/20/2023    Fibromyalgia 11/02/2023    Abnormal uterine bleeding (AUB) 03/13/2024    Endometriosis 03/13/2024    Urinary incontinence, mixed 03/13/2024    Cervical cancer screening 03/13/2024     Resolved Ambulatory Problems     Diagnosis Date Noted    No Resolved Ambulatory Problems     Past Medical History:   Diagnosis Date    Degenerative lumbar disc     Endometriosis, unspecified     Mixed incontinence        Pre-op Assessment    I have reviewed the Patient Summary Reports.     I have reviewed the Nursing Notes. I have reviewed the NPO Status.   I have reviewed the Medications.     Review of Systems  Anesthesia Hx:  No problems with previous Anesthesia   History of prior surgery of interest to airway management or planning:          Denies Family Hx of Anesthesia complications.    Denies Personal Hx of Anesthesia complications.                    Hematology/Oncology:  Hematology Normal   Oncology Normal                                   EENT/Dental:  EENT/Dental Normal           Cardiovascular:  Cardiovascular Normal                                            Pulmonary:  Pulmonary Normal                       Renal/:  Renal/ Normal                 Hepatic/GI:  Hepatic/GI Normal               "   Musculoskeletal:  Musculoskeletal Normal                Neurological:  Neurology Normal                                      Endocrine:  Endocrine Normal            Dermatological:  Skin Normal    Psych:  Psychiatric Normal                    Physical Exam  General: Alert    Airway:  Mallampati: I / I  Mouth Opening: Normal  TM Distance: Normal  Tongue: Normal  Neck ROM: Normal ROM    Dental:  Intact      Lab Results   Component Value Date    WBC 8.22 11/02/2023    HGB 14.4 11/02/2023    HCT 45.3 11/02/2023    MCV 91.5 11/02/2023     11/02/2023       CMP  Sodium Level   Date Value Ref Range Status   11/02/2023 142 136 - 145 mmol/L Final     Potassium Level   Date Value Ref Range Status   11/02/2023 4.5 3.5 - 5.1 mmol/L Final     Carbon Dioxide   Date Value Ref Range Status   11/02/2023 23 22 - 29 mmol/L Final     Blood Urea Nitrogen   Date Value Ref Range Status   11/02/2023 5.9 (L) 7.0 - 18.7 mg/dL Final     Creatinine   Date Value Ref Range Status   11/02/2023 0.69 0.55 - 1.02 mg/dL Final     Calcium Level Total   Date Value Ref Range Status   11/02/2023 9.5 8.4 - 10.2 mg/dL Final     Albumin Level   Date Value Ref Range Status   11/02/2023 4.4 3.5 - 5.0 g/dL Final     Bilirubin Total   Date Value Ref Range Status   11/02/2023 0.4 <=1.5 mg/dL Final     Alkaline Phosphatase   Date Value Ref Range Status   11/02/2023 80 40 - 150 unit/L Final     Aspartate Aminotransferase   Date Value Ref Range Status   11/02/2023 27 5 - 34 unit/L Final     Alanine Aminotransferase   Date Value Ref Range Status   11/02/2023 32 0 - 55 unit/L Final     eGFR   Date Value Ref Range Status   11/02/2023 >60 mls/min/1.73/m2 Final         Anesthesia Plan  Type of Anesthesia, risks & benefits discussed:    Anesthesia Type: Gen Natural Airway  Intra-op Monitoring Plan: Standard ASA Monitors  Post Op Pain Control Plan: IV/PO Opioids PRN  (medical reason for not using multimodal pain management)  Induction:  IV  Informed Consent:  Informed consent signed with the Patient and all parties understand the risks and agree with anesthesia plan.  All questions answered. Patient consented to blood products? No  ASA Score: 2  Day of Surgery Review of History & Physical: H&P Update referred to the surgeon/provider.    Ready For Surgery From Anesthesia Perspective.     .

## 2024-04-02 ENCOUNTER — HOSPITAL ENCOUNTER (OUTPATIENT)
Facility: HOSPITAL | Age: 44
Discharge: HOME | End: 2024-04-03
Attending: INTERNAL MEDICINE | Admitting: INTERNAL MEDICINE
Payer: MEDICAID

## 2024-04-02 DIAGNOSIS — K92.1 HEMATOCHEZIA: ICD-10-CM

## 2024-04-02 DIAGNOSIS — R19.7 DIARRHEA, UNSPECIFIED TYPE: ICD-10-CM

## 2024-04-02 DIAGNOSIS — R10.9 RIGHT SIDED ABDOMINAL PAIN: ICD-10-CM

## 2024-04-03 ENCOUNTER — ANESTHESIA (OUTPATIENT)
Dept: ENDOSCOPY | Facility: HOSPITAL | Age: 44
End: 2024-04-03
Payer: MEDICAID

## 2024-04-03 LAB
B-HCG UR QL: NEGATIVE
CTP QC/QA: YES

## 2024-04-03 PROCEDURE — D9220A PRA ANESTHESIA: Mod: ,,, | Performed by: NURSE ANESTHETIST, CERTIFIED REGISTERED

## 2024-04-03 PROCEDURE — 37000008 HC ANESTHESIA 1ST 15 MINUTES: Performed by: INTERNAL MEDICINE

## 2024-04-03 PROCEDURE — 45385 COLONOSCOPY W/LESION REMOVAL: CPT | Performed by: INTERNAL MEDICINE

## 2024-04-03 PROCEDURE — 25000003 PHARM REV CODE 250: Performed by: NURSE ANESTHETIST, CERTIFIED REGISTERED

## 2024-04-03 PROCEDURE — 37000009 HC ANESTHESIA EA ADD 15 MINS: Performed by: INTERNAL MEDICINE

## 2024-04-03 PROCEDURE — 63600175 PHARM REV CODE 636 W HCPCS: Performed by: NURSE ANESTHETIST, CERTIFIED REGISTERED

## 2024-04-03 PROCEDURE — 88305 TISSUE EXAM BY PATHOLOGIST: CPT | Mod: TC | Performed by: INTERNAL MEDICINE

## 2024-04-03 PROCEDURE — 27201423 OPTIME MED/SURG SUP & DEVICES STERILE SUPPLY: Performed by: INTERNAL MEDICINE

## 2024-04-03 PROCEDURE — 63600175 PHARM REV CODE 636 W HCPCS: Performed by: ANESTHESIOLOGY

## 2024-04-03 PROCEDURE — 81025 URINE PREGNANCY TEST: CPT | Performed by: ANESTHESIOLOGY

## 2024-04-03 RX ORDER — PROPOFOL 10 MG/ML
VIAL (ML) INTRAVENOUS
Status: DISCONTINUED | OUTPATIENT
Start: 2024-04-03 | End: 2024-04-03

## 2024-04-03 RX ORDER — SODIUM CHLORIDE, SODIUM LACTATE, POTASSIUM CHLORIDE, CALCIUM CHLORIDE 600; 310; 30; 20 MG/100ML; MG/100ML; MG/100ML; MG/100ML
INJECTION, SOLUTION INTRAVENOUS CONTINUOUS
Status: DISCONTINUED | OUTPATIENT
Start: 2024-04-03 | End: 2024-04-03 | Stop reason: HOSPADM

## 2024-04-03 RX ORDER — LIDOCAINE HYDROCHLORIDE 20 MG/ML
INJECTION, SOLUTION EPIDURAL; INFILTRATION; INTRACAUDAL; PERINEURAL
Status: DISCONTINUED | OUTPATIENT
Start: 2024-04-03 | End: 2024-04-03

## 2024-04-03 RX ADMIN — PROPOFOL 40 MG: 10 INJECTION, EMULSION INTRAVENOUS at 11:04

## 2024-04-03 RX ADMIN — SODIUM CHLORIDE, POTASSIUM CHLORIDE, SODIUM LACTATE AND CALCIUM CHLORIDE: 600; 310; 30; 20 INJECTION, SOLUTION INTRAVENOUS at 09:04

## 2024-04-03 RX ADMIN — PROPOFOL 50 MG: 10 INJECTION, EMULSION INTRAVENOUS at 11:04

## 2024-04-03 RX ADMIN — LIDOCAINE HYDROCHLORIDE 50 MG: 20 INJECTION, SOLUTION EPIDURAL; INFILTRATION; INTRACAUDAL; PERINEURAL at 11:04

## 2024-04-03 NOTE — PLAN OF CARE
Noted a piercing in mouth. States cannot remove. States Anesthesiologist discussed dangers of leaving it in. Verbalized comprehension. Refused to remove

## 2024-04-03 NOTE — TRANSFER OF CARE
Anesthesia Transfer of Care Note    Patient: Joan Frias    Procedure(s) Performed: Procedure(s) (LRB):  COLONOSCOPY (N/A)  COLONOSCOPY, WITH POLYPECTOMY USING SNARE    Patient location: GI    Anesthesia Type: general    Post pain: adequate analgesia    Post assessment: no apparent anesthetic complications and tolerated procedure well    Post vital signs: stable    Level of consciousness: awake    Nausea/Vomiting: no nausea/vomiting    Complications: none    Transfer of care protocol was followed

## 2024-04-03 NOTE — H&P
Colonoscopy History and Physical    Patient Name: Joan Frias  MRN: [id number]  : 1980  Date of Procedure:  4/3/2024  Referring Physician: Adalgisa Corrales FNP  Primary Physician: Lake Cummins MD  Procedure Physician: Tuyet Guillen MD, MPH     Procedure - Colonoscopy  ASA - per anesthesia  Mallampati - per anesthesia  History of Anesthesia problems - no  Family history Anesthesia problems -  no   Plan of anesthesia - General    Diagnosis: altered bowel habits  Chief Complaint: Same as above    HPI: Patient is an 43 y.o. female is here for the above.     Ms. Frias is a 43 year old CF with tobacco use, endometriosis, and fibromyalgia here for a colonoscopy.     She reports intermittent right-sided abdominal pain for described as burning and aching pain that is nonradiating.  The pain is worsened with eating rice, potatoes, Chinese food, and other spicy and acidic foods.  She denies specific relieving factors.  The pain will last hours to days before resolving.  She has associated nausea without vomiting.  She also has associated loose to watery stools up to 5-6 times per day with occasional red blood with bowel movements noted on the tissue after wiping and in the toilet bowl.  She also describes alternating with constipation as well.  She reports eating small amounts of food and changing her diet over the past few months with decreased frequency of abdominal pain and rectal bleeding noted since that time.  She denies any specific changes in diet or medications prior to onset of symptoms.  She reports a longstanding history of intermittent hemorrhoidal flares over the past several years.    She also reports a longstanding history of acid reflux that has improved with pantoprazole 40 mg daily which was prescribed approximately 3 months ago.  Her appetite is decreased and she reports an unintentional weight loss of approximately 30 lb over the past 3-4 months.  She denies fever, chills, vomiting,  hematemesis, odynophagia, dysphagia, early satiety, melena, fecal urgency, fecal incontinence, or pain with defecation.       Laboratory results July 3, 2023 revealed negative H pylori IgG antibody; unremarkable lipase and amylase; unremarkable CMP; unremarkable CBC; unremarkable urine culture; unremarkable TSH and T4; unremarkable hemoglobin A1c.       She is following Urology and has a cystoscopy scheduled October 5, 2023 for reports of urge incontinence over the past few months.  CT scan abdomen and pelvis with contrast was ordered in July 2023 by urology but has not been scheduled per review of patient's chart.  She denies hematuria or vaginal bleeding.     She denies ever having an EGD or colonoscopy done.  She denies regular NSAID use or use of blood thinners.  She smokes 20-30 cigarettes daily and denies alcohol use.  She denies illicit drug use.  She denies a family history of colon polyps or colon cancer.  Her paternal uncle has a history of ulcerative colitis.    Last colonoscopy: none  Family history: denies  Anticoagulation: none    ROS:  Constitutional: No fevers, chills, No weight loss  CV: No chest pain  Pulm: No cough, No shortness of breath  GI: see HPI    Medical History:   Past Medical History:   Diagnosis Date    Degenerative lumbar disc     Diarrhea 09/20/2023    Endometriosis, unspecified     Fibromyalgia     Mixed incontinence          Surgical History:   Past Surgical History:   Procedure Laterality Date    ABSCESS DRAINAGE  2019    CYST REMOVAL      TUBAL LIGATION         Family History:   Family History   Problem Relation Age of Onset    Hypertension Mother     Hyperlipidemia Mother     Uterine cancer Sister     Ulcerative colitis Paternal Uncle     Heart attack Maternal Grandfather     Cancer Maternal Grandfather     Cystic fibrosis Paternal Grandmother     COPD Paternal Grandmother     Heart attack Paternal Grandfather     Diabetes Paternal Grandfather    .    Social History:   Social  History     Socioeconomic History    Marital status: Legally    Tobacco Use    Smoking status: Every Day     Current packs/day: 1.50     Average packs/day: 1.5 packs/day for 26.2 years (39.3 ttl pk-yrs)     Types: Cigarettes     Start date: 2/1/1998    Smokeless tobacco: Never   Substance and Sexual Activity    Alcohol use: Not Currently    Drug use: Not Currently     Frequency: 7.0 times per week     Types: Marijuana     Comment: daily    Sexual activity: Yes     Partners: Male     Birth control/protection: None       Review of patient's allergies indicates:   Allergen Reactions    Codeine-cpm-pe-acetaminophen Anaphylaxis     Throat edema    Pcn [penicillins] Swelling       Medications:   Medications Prior to Admission   Medication Sig Dispense Refill Last Dose    mirabegron (MYRBETRIQ) 50 mg Tb24 Take 1 tablet (50 mg total) by mouth once daily. 90 tablet 1 4/2/2024    norethindrone (MICRONOR) 0.35 mg tablet Take 1 tablet (0.35 mg total) by mouth once daily. 90 tablet 3 4/2/2024    pantoprazole (PROTONIX) 40 MG tablet Take 1 tablet (40 mg total) by mouth every morning. 90 tablet 0 4/2/2024    (Magic mouthwash) 1:1:1 diphenhydrAMINE(Benadryl) 12.5mg/5ml liq, aluminum & magnesium hydroxide-simethicone (Maalox), LIDOcaine viscous 2% Swish and spit 15 mLs every 4 (four) hours as needed (mouth soreness). for mouth sores (Patient not taking: Reported on 3/13/2024) 480 mL 3     hyoscyamine (ANASPAZ,LEVSIN) 0.125 mg Tab Take 1 tablet (125 mcg total) by mouth every 6 (six) hours as needed (abdominal cramps). (Patient not taking: Reported on 3/13/2024) 30 tablet 2     nitrofurantoin, macrocrystal-monohydrate, (MACROBID) 100 MG capsule Take 1 capsule (100 mg total) by mouth 2 (two) times daily. (Patient not taking: Reported on 3/13/2024) 14 capsule 0     oxybutynin (DITROPAN-XL) 10 MG 24 hr tablet Take 1 tablet (10 mg total) by mouth once daily. (Patient not taking: Reported on 3/13/2024) 90 tablet 1   "        Physical Exam:    Vital Signs:   Vitals:    04/03/24 0912   BP: (!) 120/94   Pulse: 72   Resp: (!) 22   Temp: 97.6 °F (36.4 °C)     BP (!) 120/94 (BP Location: Right arm, Patient Position: Lying)   Pulse 72   Temp 97.6 °F (36.4 °C) (Oral)   Resp (!) 22   Ht 5' 6" (1.676 m)   Wt 101.6 kg (224 lb)   LMP  (Within Weeks) Comment: sometime last week  SpO2 97%   Breastfeeding No   BMI 36.15 kg/m²     General:          Well appearing in no acute distress  Lungs: Clear to auscultation bilaterally, respirations unlabored  Heart: Regular rate and rhythm, S1 and S2 normal, no obvious murmurs  Abdomen:         Soft, non-tender, bowel sounds normal, no masses, no organomegaly        Labs:  Lab Results   Component Value Date    WBC 8.22 11/02/2023    HGB 14.4 11/02/2023    HCT 45.3 11/02/2023    MCV 91.5 11/02/2023     11/02/2023     No results found for: "INR", "PT", "APTT"  Lab Results   Component Value Date     11/02/2023    K 4.5 11/02/2023    CO2 23 11/02/2023    BUN 5.9 (L) 11/02/2023    CREATININE 0.69 11/02/2023    LABPROT 7.6 11/02/2023    ALBUMIN 4.4 11/02/2023    BILITOT 0.4 11/02/2023    ALKPHOS 80 11/02/2023    ALT 32 11/02/2023    AST 27 11/02/2023         Assessment and Plan:    History reviewed, vital signs satisfactory, cardiopulmonary status satisfactory.  I have explained the sedation options, risks, benefits, and alternatives of this endoscopic procedure to the patient including but not limited to bleeding, inflammation, infection, perforation, and death.  All questions were answered and the patient consented to proceed with procedure as planned.   The patient is deemed an appropriate candidate for the sedation as planned.      Tuyet Guillen MD, MPH   of Clinical Medicine  Gastroenterology and Hepatology  [facility]    4/3/2024  9:47 AM     "

## 2024-04-03 NOTE — PLAN OF CARE
Back from procedure. Awake alert no acute distress. Up with assistance to bathroom now. Back in bed. Sr up x 2. Dc instructions given and reviewed. Sipping juice and eating crackers.

## 2024-04-03 NOTE — ANESTHESIA POSTPROCEDURE EVALUATION
Anesthesia Post Evaluation    Patient: Joan Frias    Procedure(s) Performed: Procedure(s) (LRB):  COLONOSCOPY (N/A)  COLONOSCOPY, WITH POLYPECTOMY USING SNARE    Final Anesthesia Type: general      Patient location during evaluation: GI PACU  Patient participation: Yes- Able to Participate  Level of consciousness: awake and alert  Post-procedure vital signs: reviewed and stable  Pain management: adequate  Airway patency: patent    PONV status at discharge: No PONV  Anesthetic complications: no      Cardiovascular status: hemodynamically stable  Respiratory status: unassisted and room air  Follow-up not needed.              Pain/Gregoria Score: Gregoria Score: 10 (4/3/2024 11:40 AM)

## 2024-04-03 NOTE — PROVATION PATIENT INSTRUCTIONS
Discharge Summary/Instructions after an Endoscopic Procedure  Patient Name: Joan Frias  Patient MRN: [id number]  Patient YOB: 1980  Wednesday, April 3, 2024  Tuyet Guillen MD  Dear patient,  As a result of recent federal legislation (The Federal Cures Act), you may   receive lab or pathology results from your procedure in your [facility]   account before your physician is able to contact you. Your physician or   their representative will relay the results to you with their   recommendations at their soonest availability.  Thank you,  RESTRICTIONS:  During your procedure today, you received medications for sedation.  These   medications may affect your judgment, balance and coordination.  Therefore,   for 24 hours, you have the following restrictions:   - DO NOT drive a car, operate machinery, make legal/financial decisions,   sign important papers or drink alcohol.    ACTIVITY:  Today: no heavy lifting, straining or running due to procedural   sedation/anesthesia.  The following day: return to full activity including work.  DIET:  Eat and drink normally unless instructed otherwise.     TREATMENT FOR COMMON SIDE EFFECTS:  - Mild abdominal pain, nausea, belching, bloating or excessive gas:  rest,   eat lightly and use a heating pad.  - Sore Throat: treat with throat lozenges and/or gargle with warm salt   water.  - Because air was used during the procedure, expelling large amounts of air   from your rectum or belching is normal.  - If a bowel prep was taken, you may not have a bowel movement for 1-3 days.    This is normal.  SYMPTOMS TO WATCH FOR AND REPORT TO YOUR PHYSICIAN:  1. Abdominal pain or bloating, other than gas cramps.  2. Chest pain.  3. Back pain.  4. Signs of infection such as: chills or fever occurring within 24 hours   after the procedure.  5. Rectal bleeding, which would show as bright red, maroon, or black stools.   (A tablespoon of blood from the rectum is not serious, especially  if   hemorrhoids are present.)  6. Vomiting.  7. Weakness or dizziness.  GO DIRECTLY TO THE NEAREST EMERGENCY ROOM IF YOU HAVE ANY OF THE FOLLOWING:      Difficulty breathing              Chills and/or fever over 101 F   Persistent vomiting and/or vomiting blood   Severe abdominal pain   Severe chest pain   Black, tarry stools   Bleeding- more than one tablespoon   Any other symptom or condition that you feel may need urgent attention  Your doctor recommends these additional instructions:  If any biopsies were taken, your doctors clinic will contact you in 1 to 2   weeks with any results.  - Patient has a contact number available for emergencies.  The signs and   symptoms of potential delayed complications were discussed with the   patient.  Return to normal activities tomorrow.  Written discharge   instructions were provided to the patient.   - Discharge patient to home.   - Resume previous diet.   - Continue present medications.   - Await pathology results.   - Repeat colonoscopy in 7-10 years for surveillance.   - Use fiber, for example Citrucel, Fibercon, Konsyl or Metamucil.  For questions, problems or results please call your physician - Tuyet Guillen MD at Work:  [phone], Work:  [phone].  [facility] , EMERGENCY ROOM PHONE NUMBER: [phone]  IF A COMPLICATION OR EMERGENCY SITUATION ARISES AND YOU ARE UNABLE TO REACH   YOUR PHYSICIAN - GO DIRECTLY TO THE EMERGENCY ROOM.  Tuyet Guillen MD  4/3/2024 11:44:16 AM  This report has been verified and signed electronically.  Dear patient,  As a result of recent federal legislation (The Federal Cures Act), you may   receive lab or pathology results from your procedure in your [facility]   account before your physician is able to contact you. Your physician or   their representative will relay the results to you with their   recommendations at their soonest availability.  Thank you,  PROVATION

## 2024-04-04 ENCOUNTER — OFFICE VISIT (OUTPATIENT)
Dept: UROLOGY | Facility: CLINIC | Age: 44
End: 2024-04-04
Payer: MEDICAID

## 2024-04-04 VITALS
OXYGEN SATURATION: 100 % | DIASTOLIC BLOOD PRESSURE: 86 MMHG | TEMPERATURE: 99 F | WEIGHT: 226.81 LBS | RESPIRATION RATE: 18 BRPM | SYSTOLIC BLOOD PRESSURE: 121 MMHG | HEART RATE: 86 BPM | HEIGHT: 66 IN | BODY MASS INDEX: 36.45 KG/M2

## 2024-04-04 VITALS
RESPIRATION RATE: 18 BRPM | HEIGHT: 66 IN | SYSTOLIC BLOOD PRESSURE: 103 MMHG | OXYGEN SATURATION: 100 % | DIASTOLIC BLOOD PRESSURE: 76 MMHG | BODY MASS INDEX: 36 KG/M2 | WEIGHT: 224 LBS | HEART RATE: 62 BPM | TEMPERATURE: 98 F

## 2024-04-04 DIAGNOSIS — N39.3 STRESS INCONTINENCE: ICD-10-CM

## 2024-04-04 DIAGNOSIS — R31.21 ASYMPTOMATIC MICROSCOPIC HEMATURIA: Primary | ICD-10-CM

## 2024-04-04 DIAGNOSIS — N39.41 URGE INCONTINENCE: ICD-10-CM

## 2024-04-04 LAB
BILIRUB SERPL-MCNC: NORMAL MG/DL
BLOOD URINE, POC: NORMAL
COLOR, POC UA: NORMAL
ESTROGEN SERPL-MCNC: NORMAL PG/ML
GLUCOSE UR QL STRIP: NORMAL
INSULIN SERPL-ACNC: NORMAL U[IU]/ML
KETONES UR QL STRIP: NORMAL
LAB AP CLINICAL INFORMATION: NORMAL
LAB AP GROSS DESCRIPTION: NORMAL
LAB AP URINE CYTOLOGY INTERPRETATION SPECIMEN 1: NORMAL
LEUKOCYTE ESTERASE URINE, POC: NORMAL
NITRITE, POC UA: NORMAL
PH, POC UA: 6
PROTEIN, POC: NORMAL
SPECIFIC GRAVITY, POC UA: 1.01
UROBILINOGEN, POC UA: 0.2

## 2024-04-04 PROCEDURE — 3008F BODY MASS INDEX DOCD: CPT | Mod: CPTII,,, | Performed by: UROLOGY

## 2024-04-04 PROCEDURE — 88108 CYTOPATH CONCENTRATE TECH: CPT | Mod: TC | Performed by: UROLOGY

## 2024-04-04 PROCEDURE — 3079F DIAST BP 80-89 MM HG: CPT | Mod: CPTII,,, | Performed by: UROLOGY

## 2024-04-04 PROCEDURE — 3074F SYST BP LT 130 MM HG: CPT | Mod: CPTII,,, | Performed by: UROLOGY

## 2024-04-04 PROCEDURE — 99214 OFFICE O/P EST MOD 30 MIN: CPT | Mod: S$PBB,,, | Performed by: UROLOGY

## 2024-04-04 PROCEDURE — 99215 OFFICE O/P EST HI 40 MIN: CPT | Mod: PBBFAC | Performed by: UROLOGY

## 2024-04-04 PROCEDURE — 81001 URINALYSIS AUTO W/SCOPE: CPT | Mod: PBBFAC | Performed by: UROLOGY

## 2024-04-04 PROCEDURE — 1159F MED LIST DOCD IN RCRD: CPT | Mod: CPTII,,, | Performed by: UROLOGY

## 2024-04-04 PROCEDURE — 1160F RVW MEDS BY RX/DR IN RCRD: CPT | Mod: CPTII,,, | Performed by: UROLOGY

## 2024-04-04 NOTE — PROGRESS NOTES
CC:  Six month follow-up    HPI:  Joan Frias is a 43 y.o. female here for follow-up.  She has a history of microscopic hematuria.  She does state that the urine is brown on occasion and that no matter how much fluid she drinks the urine does not widened up.  She had a cystoscopy in October of 2023 which was negative and a CT scan in September of 2023 which was negative.  She has smoked 1 1/2 to 2 packs per day for 20 years.  She did have one episode of kidney stones when she was in her 20s which she passed on her own.  The CT scan in September did not show any stones.  She complains of stress urinary incontinence with sneeze, laugh, cough or even walking fast.  She also has intermittent episodes of urge incontinence.  She was placed on Myrbetriq.  When she started taking it she did notice a difference in giving her more time to get to the bathroom.  She had difficulty getting the prescriptions filled and was off of it for a while and now just got back on it.    Urinalysis:    Results for orders placed or performed in visit on 04/04/24   POCT URINE DIPSTICK WITH MICROSCOPE, AUTOMATED   Result Value Ref Range    Color, UA Light Yellow     Spec Grav UA 1.010     pH, UA 6.0     WBC, UA neg     Nitrite, UA neg     Protein, POC neg     Glucose, UA neg     Ketones, UA neg     Urobilinogen, UA 0.2     Bilirubin, POC neg     Blood, UA neg      Microscopic Urinalysis:  WBC:   None per HPF     RBC:    None per HPF     Bacteria:    None per HPF     Squamous epithelial cells:    None per HPF      Crystals:   None    ROS:  All systems reviewed and are negative except as documented in HPI and/or Assessment and Plan.     Patient Active Problem List:     Patient Active Problem List   Diagnosis    Hematochezia    Diarrhea    Abdominal pain    Tobacco user    Fibromyalgia    Abnormal uterine bleeding (AUB)    Endometriosis    Urinary incontinence, mixed    Cervical cancer screening        Past Medical History:  Past Medical History:    Diagnosis Date    Degenerative lumbar disc     Diarrhea 09/20/2023    Endometriosis, unspecified     Fibromyalgia     Mixed incontinence         Past Surgical History:  Past Surgical History:   Procedure Laterality Date    ABSCESS DRAINAGE  2019    COLONOSCOPY N/A 4/3/2024    Procedure: COLONOSCOPY;  Surgeon: Tuyet Guillen MD;  Location: [facility] ENDOSCOPY;  Service: Gastroenterology;  Laterality: N/A;    COLONOSCOPY, WITH POLYPECTOMY USING SNARE  4/3/2024    Procedure: COLONOSCOPY, WITH POLYPECTOMY USING SNARE;  Surgeon: Tuyet Guillen MD;  Location: [facility] ENDOSCOPY;  Service: Gastroenterology;;    CYST REMOVAL      TUBAL LIGATION          Family History:  Family History   Problem Relation Age of Onset    Hypertension Mother     Hyperlipidemia Mother     Uterine cancer Sister     Ulcerative colitis Paternal Uncle     Heart attack Maternal Grandfather     Cancer Maternal Grandfather     Cystic fibrosis Paternal Grandmother     COPD Paternal Grandmother     Heart attack Paternal Grandfather     Diabetes Paternal Grandfather         Social History:  Social History     Socioeconomic History    Marital status: Legally    Tobacco Use    Smoking status: Every Day     Current packs/day: 1.50     Average packs/day: 1.5 packs/day for 26.2 years (39.3 ttl pk-yrs)     Types: Cigarettes     Start date: 2/1/1998    Smokeless tobacco: Never   Substance and Sexual Activity    Alcohol use: Not Currently    Drug use: Not Currently     Frequency: 7.0 times per week     Types: Marijuana     Comment: daily    Sexual activity: Yes     Partners: Male     Birth control/protection: None        Allergies:  Review of patient's allergies indicates:   Allergen Reactions    Codeine-cpm-pe-acetaminophen Anaphylaxis     Throat edema    Pcn [penicillins] Swelling        Objective:  Vitals:    04/04/24 0858   BP: 121/86   Pulse: 86   Resp: 18   Temp: 98.5 °F (36.9 °C)     General:  Well developed, well nourished adult female in  no acute distress  Abdomen: Soft, nontender, no masses  Extremities:  No clubbing, cyanosis, or edema  Neurologic:  Grossly intact  Musculoskeletal:  Normal tone    Assessment:  1. Asymptomatic microscopic hematuria  - Cytology, Urine    2. Stress incontinence  - POCT URINE DIPSTICK WITH MICROSCOPE, AUTOMATED  - Ambulatory referral/consult to Physical/Occupational Therapy; Future    3. Urge incontinence  - Ambulatory referral/consult to Physical/Occupational Therapy; Future     Plan:  Urine cytology sent today to follow-up the microscopic hematuria.  I discussed stress incontinence with her and the options for treating this.  She would like to try physical therapy so a referral was sent.  Continue Myrbetriq.    Follow-up:    Three months with bladder scan.

## 2024-04-05 LAB
ESTROGEN SERPL-MCNC: NORMAL PG/ML
INSULIN SERPL-ACNC: NORMAL U[IU]/ML
LAB AP CLINICAL INFORMATION: NORMAL
LAB AP GROSS DESCRIPTION: NORMAL
LAB AP REPORT FOOTNOTES: NORMAL
T3RU NFR SERPL: NORMAL %

## 2024-04-08 ENCOUNTER — TELEPHONE (OUTPATIENT)
Dept: ENDOSCOPY | Facility: HOSPITAL | Age: 44
End: 2024-04-08
Payer: MEDICAID

## 2024-04-08 NOTE — TELEPHONE ENCOUNTER
----- Message from Tuyet Guillen MD sent at 4/5/2024  5:45 PM CDT -----  [email or web address] message sent to the patient about results.  Please call patient with results if not viewed.     Thanks,  MB

## 2024-05-14 ENCOUNTER — LAB VISIT (OUTPATIENT)
Dept: LAB | Facility: HOSPITAL | Age: 44
End: 2024-05-14
Attending: STUDENT IN AN ORGANIZED HEALTH CARE EDUCATION/TRAINING PROGRAM
Payer: MEDICAID

## 2024-05-14 ENCOUNTER — OFFICE VISIT (OUTPATIENT)
Dept: INTERNAL MEDICINE | Facility: CLINIC | Age: 44
End: 2024-05-14
Payer: MEDICAID

## 2024-05-14 VITALS
HEART RATE: 85 BPM | SYSTOLIC BLOOD PRESSURE: 129 MMHG | HEIGHT: 66 IN | DIASTOLIC BLOOD PRESSURE: 85 MMHG | BODY MASS INDEX: 36.77 KG/M2 | WEIGHT: 228.81 LBS | OXYGEN SATURATION: 97 % | RESPIRATION RATE: 19 BRPM | TEMPERATURE: 99 F

## 2024-05-14 DIAGNOSIS — Z72.0 TOBACCO USER: ICD-10-CM

## 2024-05-14 DIAGNOSIS — R10.9 RIGHT SIDED ABDOMINAL PAIN: ICD-10-CM

## 2024-05-14 DIAGNOSIS — Z00.00 HEALTHCARE MAINTENANCE: ICD-10-CM

## 2024-05-14 DIAGNOSIS — M79.7 FIBROMYALGIA: Chronic | ICD-10-CM

## 2024-05-14 DIAGNOSIS — N93.9 ABNORMAL UTERINE BLEEDING (AUB): Primary | ICD-10-CM

## 2024-05-14 DIAGNOSIS — G89.29 CHRONIC MIDLINE LOW BACK PAIN WITHOUT SCIATICA: ICD-10-CM

## 2024-05-14 DIAGNOSIS — M25.511 ACUTE PAIN OF RIGHT SHOULDER: ICD-10-CM

## 2024-05-14 DIAGNOSIS — M54.2 NONSPECIFIC PAIN IN THE NECK REGION: ICD-10-CM

## 2024-05-14 DIAGNOSIS — R10.9 ABDOMINAL PAIN, UNSPECIFIED ABDOMINAL LOCATION: ICD-10-CM

## 2024-05-14 DIAGNOSIS — R19.7 DIARRHEA, UNSPECIFIED TYPE: ICD-10-CM

## 2024-05-14 DIAGNOSIS — N93.9 ABNORMAL UTERINE BLEEDING (AUB): ICD-10-CM

## 2024-05-14 DIAGNOSIS — M54.50 CHRONIC MIDLINE LOW BACK PAIN WITHOUT SCIATICA: ICD-10-CM

## 2024-05-14 DIAGNOSIS — N80.9 ENDOMETRIOSIS: ICD-10-CM

## 2024-05-14 LAB
25(OH)D3+25(OH)D2 SERPL-MCNC: 28 NG/ML (ref 30–80)
ALBUMIN SERPL-MCNC: 4.3 G/DL (ref 3.5–5)
ALBUMIN/GLOB SERPL: 1.3 RATIO (ref 1.1–2)
ALP SERPL-CCNC: 72 UNIT/L (ref 40–150)
ALT SERPL-CCNC: 30 UNIT/L (ref 0–55)
AST SERPL-CCNC: 22 UNIT/L (ref 5–34)
BASOPHILS # BLD AUTO: 0.05 X10(3)/MCL
BASOPHILS NFR BLD AUTO: 0.6 %
BILIRUB SERPL-MCNC: 0.3 MG/DL
BUN SERPL-MCNC: 5.5 MG/DL (ref 7–18.7)
CALCIUM SERPL-MCNC: 9.7 MG/DL (ref 8.4–10.2)
CHLORIDE SERPL-SCNC: 108 MMOL/L (ref 98–107)
CO2 SERPL-SCNC: 26 MMOL/L (ref 22–29)
CREAT SERPL-MCNC: 0.74 MG/DL (ref 0.55–1.02)
EOSINOPHIL # BLD AUTO: 0.1 X10(3)/MCL (ref 0–0.9)
EOSINOPHIL NFR BLD AUTO: 1.2 %
ERYTHROCYTE [DISTWIDTH] IN BLOOD BY AUTOMATED COUNT: 13.5 % (ref 11.5–17)
GFR SERPLBLD CREATININE-BSD FMLA CKD-EPI: >60 ML/MIN/1.73/M2
GLOBULIN SER-MCNC: 3.3 GM/DL (ref 2.4–3.5)
GLUCOSE SERPL-MCNC: 94 MG/DL (ref 74–100)
HCT VFR BLD AUTO: 43.9 % (ref 37–47)
HGB BLD-MCNC: 14.5 G/DL (ref 12–16)
IMM GRANULOCYTES # BLD AUTO: 0.03 X10(3)/MCL (ref 0–0.04)
IMM GRANULOCYTES NFR BLD AUTO: 0.4 %
LYMPHOCYTES # BLD AUTO: 3.09 X10(3)/MCL (ref 0.6–4.6)
LYMPHOCYTES NFR BLD AUTO: 37.4 %
MCH RBC QN AUTO: 29.3 PG (ref 27–31)
MCHC RBC AUTO-ENTMCNC: 33 G/DL (ref 33–36)
MCV RBC AUTO: 88.7 FL (ref 80–94)
MONOCYTES # BLD AUTO: 0.37 X10(3)/MCL (ref 0.1–1.3)
MONOCYTES NFR BLD AUTO: 4.5 %
NEUTROPHILS # BLD AUTO: 4.62 X10(3)/MCL (ref 2.1–9.2)
NEUTROPHILS NFR BLD AUTO: 55.9 %
NRBC BLD AUTO-RTO: 0 %
PLATELET # BLD AUTO: 220 X10(3)/MCL (ref 130–400)
PMV BLD AUTO: 10.9 FL (ref 7.4–10.4)
POTASSIUM SERPL-SCNC: 4.5 MMOL/L (ref 3.5–5.1)
PROT SERPL-MCNC: 7.6 GM/DL (ref 6.4–8.3)
RBC # BLD AUTO: 4.95 X10(6)/MCL (ref 4.2–5.4)
SODIUM SERPL-SCNC: 141 MMOL/L (ref 136–145)
WBC # SPEC AUTO: 8.26 X10(3)/MCL (ref 4.5–11.5)

## 2024-05-14 PROCEDURE — 80053 COMPREHEN METABOLIC PANEL: CPT

## 2024-05-14 PROCEDURE — 85025 COMPLETE CBC W/AUTO DIFF WBC: CPT

## 2024-05-14 PROCEDURE — 86364 TISS TRNSGLTMNASE EA IG CLAS: CPT

## 2024-05-14 PROCEDURE — 36415 COLL VENOUS BLD VENIPUNCTURE: CPT

## 2024-05-14 PROCEDURE — 99214 OFFICE O/P EST MOD 30 MIN: CPT | Mod: PBBFAC | Performed by: STUDENT IN AN ORGANIZED HEALTH CARE EDUCATION/TRAINING PROGRAM

## 2024-05-14 PROCEDURE — 82306 VITAMIN D 25 HYDROXY: CPT

## 2024-05-14 NOTE — PROGRESS NOTES
"[facility] Clinic Visit    Chief Complaint:      Follow-up (Here today for f/u visit. No meds taken this a.m.. Pt c/o rt/ si8des hip and lower pelvic pain. C/o sharp sudden pain to rt/ neck and chest. )     Subjective:     HPI:  Joan Frias is a 43 y.o. female with a endometriosis, AUB, fibromyalgia, class II obesity, chronic lower back pain, longtime smoker 1 PPD w/25 pack year history, daily marijuana use here for clinic f/u.  Patient continuing to chronic lower back pain as well as right neck, shoulder area pain that is sharp in character and reproducible with significant soreness.  Denies shortness or breath, nausea, lightheadedness, syncope during these episodes which have only been occurring for the last couple of weeks.  She continues to have intermittent diarrhea as well as constipation, seen by GI with extensive workup which is negative for an infectious etiology or inflammatory bowel disease.  Had recent colonoscopy which had to polyps, 1 adenomatous 1 hyperplastic which were removed, and internal hemorrhoids noted.  No longer having significant hematochezia.  She does feel fatigued most of the time which she is not sure is from anemia or fibromyalgia.  Continuing to have heavy periods, seeing Gynecology and started on norethindrone which has not helped much, interested in hysterectomy and has f/u visit next month. Pt attempted PT x2 sessions but was uncomfortable with staff as they were very unprofessional, so will need new referral for PT.      Review of Systems  12 point ROS negative unless mentioned above    Objective:   Last 24 Hour Vital Signs:  Vitals  BP: 129/85  Temp: 98.7 °F (37.1 °C)  Temp Source: Oral  Pulse: 85  Resp: 19  SpO2: 97 %  Height: 5' 6" (167.6 cm)  Weight: 103.8 kg (228 lb 12.8 oz)    Physical Examination:    General: Well nourished, well developed in NAD  HEENT: PERRLA, NC/AT, MMM  Respiratory: CTAB, normal respiratory effort  Cardiovascular: RRR, no " "m/r/g  Gastrointestinal: S, non-tender, ND, active BS, no masses palpable  Musculoskeletal: no obvious deformities, TTP to R shoulder/neck area with limited ROM  Integumentary: no rashes or skin lesions present  Neurologic: AAOx4, CN II-XII grossly intact, normal gait    Labs  BMP:   Lab Results   Component Value Date    CHLORIDE 109 (H) 11/02/2023    CO2 23 11/02/2023    BUN 5.9 (L) 11/02/2023    CREATININE 0.69 11/02/2023    GLUCOSE 86 11/02/2023    CALCIUM 9.5 11/02/2023     CBC:   Lab Results   Component Value Date    WBC 8.22 11/02/2023    HGB 14.4 11/02/2023    HCT 45.3 11/02/2023    MCV 91.5 11/02/2023    RDW 13.1 11/02/2023     LFTs:   Lab Results   Component Value Date    LABPROT 7.6 11/02/2023    ALBUMIN 4.4 11/02/2023    AST 27 11/02/2023    ALT 32 11/02/2023    ALKPHOS 80 11/02/2023     FLP:   Lab Results   Component Value Date    CHOL 213 (H) 11/02/2023    HDL 46 11/02/2023    .00 (H) 11/02/2023    TRIG 91 11/02/2023    TOTALCHOLEST 5 11/02/2023     DM:   Lab Results   Component Value Date    HGBA1C 5.1 11/02/2023    EAG 99.7 11/02/2023    CREATININE 0.69 11/02/2023     Thyroid:   Lab Results   Component Value Date    TSH 1.089 03/13/2024     Anemia: No results found for: "IRON", "TIBC", "FERRITIN", "[id number]", "FOLATE"          Assessment & Plan:     Urge incontinence  - Continue Myrbetriq and Ditropan per urology    - Continue f/u with [facility]    GERD  -Symptoms improved on Protonix   -Continue Protonix 40 mg daily for now    RUQ/RLQ Abdominal Pain  Diarrhea  -Seen by GI, pending w/u with stool studies  -Will add TTG and Vit D; concern for malabsorptive disease and pt states she's eating less bread/starch with some improvement of symptoms  -CT scan not concerning for acute abdominal findings  -CMP pending, if LE or bili elevated will order abd US gallbladder    Fibromyalgia  -Pt has reported hx of fibromyalgia  -Referring to [facility] for PT for back/neck/shoulder  -Will start " milnacipran dose pack up to 50 mg BID dosing after 1 week  -If no success with above, can try duloxetine or tricyclic    AUB  Menorrhagia  Endometriosis  -Pt has heavy, long periods with some bleeding in between as cycles  -Follows with GYN, started on norethindrone with minimal improvement  -Has appointment next month to discuss possible hysterectomy vs IUD  -Will check CBC given fatigue today and hx of heavy bleeding  -If anemic will need iron, ferritin, B12, folate w/u    Smoker  -Pt w/ 25 pack year hx, smoking 1/2 PPD  -Will attempt to quit cold turkey, declining medical therapy    Health Maintenance  -Vaccines:    -Pneumonia: refused    -Tdap: refused    -Flu: refused    -Covid: refused  -10 year ASCVD risk: 3.9% on 5/14/24, no indication for statin therapy  -Colon Cancer Screen: c-scope 4/3/24- needs repeat in 7 years per GI recs  -Breast Cancer Screen: discussed with pt, prefer to start MMG at age 47  -Cervical Cancer Screen: Pap negative 3/13/24, repeat per GYN recs  -Bone Density Screen: n/a  -HCV lifetime screen: ordered  -HIV lifetime screen: ordered      To be addressed at next follow-up  -labs, tolerance of milnaciprine      Follow-ups  -Follow-up medicine clinic in 3-4 m      Lake Cummins MD  [employer] [facility] PGY III

## 2024-05-16 LAB — ELIA CELIKEY IGG (TTG IGG) QUANTITATIVE: <0.6 U/ML

## 2024-06-06 DIAGNOSIS — K21.9 GASTROESOPHAGEAL REFLUX DISEASE, UNSPECIFIED WHETHER ESOPHAGITIS PRESENT: ICD-10-CM

## 2024-06-07 RX ORDER — PANTOPRAZOLE SODIUM 40 MG/1
40 TABLET, DELAYED RELEASE ORAL EVERY MORNING
Qty: 90 TABLET | Refills: 0 | Status: SHIPPED | OUTPATIENT
Start: 2024-06-07 | End: 2024-06-11 | Stop reason: SDUPTHER

## 2024-06-11 DIAGNOSIS — N39.41 URGE INCONTINENCE: ICD-10-CM

## 2024-06-11 DIAGNOSIS — K21.9 GASTROESOPHAGEAL REFLUX DISEASE, UNSPECIFIED WHETHER ESOPHAGITIS PRESENT: ICD-10-CM

## 2024-06-11 RX ORDER — PANTOPRAZOLE SODIUM 40 MG/1
40 TABLET, DELAYED RELEASE ORAL EVERY MORNING
Qty: 90 TABLET | Refills: 0 | Status: SHIPPED | OUTPATIENT
Start: 2024-06-11

## 2024-06-11 RX ORDER — OXYBUTYNIN CHLORIDE 10 MG/1
10 TABLET, EXTENDED RELEASE ORAL
Qty: 30 TABLET | Refills: 0 | Status: SHIPPED | OUTPATIENT
Start: 2024-06-11

## 2024-06-11 NOTE — PROGRESS NOTES
MD refilled requested medication. Prescription sent to patient's preferred pharmacy.    Rosaura Ndiaye MD  [employer] [facility] - PGY-2  2:07 PM 06/11/2024

## 2024-07-08 ENCOUNTER — OFFICE VISIT (OUTPATIENT)
Dept: UROLOGY | Facility: CLINIC | Age: 44
End: 2024-07-08
Payer: MEDICAID

## 2024-07-08 VITALS
SYSTOLIC BLOOD PRESSURE: 130 MMHG | DIASTOLIC BLOOD PRESSURE: 85 MMHG | HEART RATE: 88 BPM | OXYGEN SATURATION: 98 % | WEIGHT: 226.19 LBS | HEIGHT: 66 IN | TEMPERATURE: 98 F | BODY MASS INDEX: 36.35 KG/M2

## 2024-07-08 DIAGNOSIS — R31.21 ASYMPTOMATIC MICROSCOPIC HEMATURIA: Primary | ICD-10-CM

## 2024-07-08 DIAGNOSIS — N39.41 URGE INCONTINENCE: ICD-10-CM

## 2024-07-08 DIAGNOSIS — N39.3 STRESS INCONTINENCE: ICD-10-CM

## 2024-07-08 DIAGNOSIS — Z87.442 HISTORY OF KIDNEY STONES: ICD-10-CM

## 2024-07-08 LAB
BILIRUB SERPL-MCNC: NORMAL MG/DL
BLOOD URINE, POC: NORMAL
COLOR, POC UA: YELLOW
GLUCOSE UR QL STRIP: NORMAL
KETONES UR QL STRIP: NORMAL
LEUKOCYTE ESTERASE URINE, POC: NORMAL
NITRITE, POC UA: NORMAL
PH, POC UA: 7.5
POC RESIDUAL URINE VOLUME: 42 ML (ref 0–100)
PROTEIN, POC: NORMAL
SPECIFIC GRAVITY, POC UA: 1.01
UROBILINOGEN, POC UA: 0.2

## 2024-07-08 PROCEDURE — 3075F SYST BP GE 130 - 139MM HG: CPT | Mod: CPTII,,, | Performed by: UROLOGY

## 2024-07-08 PROCEDURE — 81001 URINALYSIS AUTO W/SCOPE: CPT | Mod: PBBFAC | Performed by: UROLOGY

## 2024-07-08 PROCEDURE — 1160F RVW MEDS BY RX/DR IN RCRD: CPT | Mod: CPTII,,, | Performed by: UROLOGY

## 2024-07-08 PROCEDURE — 3008F BODY MASS INDEX DOCD: CPT | Mod: CPTII,,, | Performed by: UROLOGY

## 2024-07-08 PROCEDURE — 99215 OFFICE O/P EST HI 40 MIN: CPT | Mod: PBBFAC | Performed by: UROLOGY

## 2024-07-08 PROCEDURE — 3079F DIAST BP 80-89 MM HG: CPT | Mod: CPTII,,, | Performed by: UROLOGY

## 2024-07-08 PROCEDURE — 99214 OFFICE O/P EST MOD 30 MIN: CPT | Mod: S$PBB,,, | Performed by: UROLOGY

## 2024-07-08 PROCEDURE — 88108 CYTOPATH CONCENTRATE TECH: CPT | Mod: TC | Performed by: UROLOGY

## 2024-07-08 PROCEDURE — 1159F MED LIST DOCD IN RCRD: CPT | Mod: CPTII,,, | Performed by: UROLOGY

## 2024-07-08 PROCEDURE — 51798 US URINE CAPACITY MEASURE: CPT | Mod: PBBFAC | Performed by: UROLOGY

## 2024-07-08 NOTE — PROGRESS NOTES
Patient seen by Dr. NORMA Price. Will return in 6 months with referral to Pelvic floor Physical therapy. Written and verbal discharge instructions given.

## 2024-07-10 LAB
ESTROGEN SERPL-MCNC: NORMAL PG/ML
INSULIN SERPL-ACNC: NORMAL U[IU]/ML
LAB AP CLINICAL INFORMATION: NORMAL
LAB AP GROSS DESCRIPTION: NORMAL
LAB AP URINE CYTOLOGY INTERPRETATION SPECIMEN 1: NORMAL

## 2024-07-15 ENCOUNTER — OFFICE VISIT (OUTPATIENT)
Dept: GYNECOLOGY | Facility: CLINIC | Age: 44
End: 2024-07-15
Payer: MEDICAID

## 2024-07-15 VITALS
BODY MASS INDEX: 35.79 KG/M2 | SYSTOLIC BLOOD PRESSURE: 111 MMHG | WEIGHT: 228 LBS | HEART RATE: 79 BPM | HEIGHT: 67 IN | OXYGEN SATURATION: 100 % | RESPIRATION RATE: 20 BRPM | DIASTOLIC BLOOD PRESSURE: 76 MMHG | TEMPERATURE: 99 F

## 2024-07-15 DIAGNOSIS — N93.9 ABNORMAL UTERINE BLEEDING (AUB): ICD-10-CM

## 2024-07-15 DIAGNOSIS — N80.03 ADENOMYOSIS: ICD-10-CM

## 2024-07-15 DIAGNOSIS — N80.9 ENDOMETRIOSIS: Primary | ICD-10-CM

## 2024-07-15 PROCEDURE — 99215 OFFICE O/P EST HI 40 MIN: CPT | Mod: PBBFAC

## 2024-07-15 PROCEDURE — 99459 PELVIC EXAMINATION: CPT | Mod: PBBFAC

## 2024-07-15 RX ORDER — NORETHINDRONE 5 MG/1
5 TABLET ORAL DAILY
Qty: 90 TABLET | Refills: 3 | Status: SHIPPED | OUTPATIENT
Start: 2024-07-15 | End: 2025-07-15

## 2024-07-15 NOTE — PROGRESS NOTES
[facility] GYNECOLOGY CLINIC NOTE    AUB-A: Patient was started on norethindrone .35 mg oral daily in March and reports no improvement in heavy bleeding or irregular menses. Usually bleeding >2 weeks at a time. She reported weight gain and swelling in her hands and feet that she noticed after starting on the norethindrone. She reported bleeding for 19 days in May, changing an overnight pad once/hour and frequent passing of large clots. Reports cramping associated with periods. LMP 2024.      Endometriosis: Minimal improvement in pain after starting norethindrone 4 months ago. Patient reports dysmenorrhea, pain starts 1-2 days before cycle and lasts through menses. Reports that in past, OCPs did alleviate pain somewhat. Hx of tobacco use, for this reason discontinued OCPs after age 37.     Urinary frequency and incontinence: following with urology, recently referred for PT, recent work up with benign findings    Hematochezia: reports one episode of hematochezia since colonoscopy, patient has communicated with GI and is awaiting appointment     Patient denies abnormal discharge, pelvic pain, vulvar rash or skin changes, dysuria, dyspareunia. Sexually active with one male partner.     Gynecology  OB History          3    Para   3    Term                AB        Living             SAB        IAB        Ectopic        Multiple        Live Births               Obstetric Comments    x3               x3    Past Medical History:   Diagnosis Date    Abnormal uterine bleeding     Degenerative lumbar disc     Diarrhea 2023    Endometriosis, unspecified     Fibromyalgia     Mixed incontinence       Past Surgical History:   Procedure Laterality Date    ABSCESS DRAINAGE      COLONOSCOPY N/A 2024    Procedure: COLONOSCOPY;  Surgeon: Tuyet Guillen MD;  Location: [facility] ENDOSCOPY;  Service: Gastroenterology;  Laterality: N/A;    COLONOSCOPY, WITH POLYPECTOMY USING SNARE  2024     Patient Name: Jaye Jones  YOB: 1984          MRN number:  [id number]  Date:  5/23/2024  Referring Physician:  Bhaskar Lacy    Discharge Summary  Number of Visits Attended 12 in Occupational Therapy    Dear Dr. Lacy,    Discharge Summary  Diagnosis:   Cerebral palsy (HCC) (G80.9)          Referring Provider: Bhaskar Lacy  Date of Evaluation:    4/12/2024    Precautions:  Cerebral Palsy Next MD visit:   none scheduled  Date of Surgery: n/a   Insurance Primary/Secondary: BCBS POS       # Auth Visits: 12/12     Pt has attended 12 visits in Occupational Therapy.     Subjective: Patient presents to OT appt today noting she has been completing home exercises. Notes LUE muscle spasms still occur but feels they are more controlled at this time. LUE muscle spasms were present prior to illness in late 2023 due related to CP.    Assessment: Patient progressing from a therapy standpoint with focus on decreasing frequency and increasing LUE muscle spasm management. Difficult to gauge improvement due to underlying CP with baseline muscle spasms although patient notes frequency of spasms has decreased since beginning OT. Patient equipped with HEP exercises for LUE stretching and graded strengthening for LUE hand with theraputty. Patient to continue at home and follow-up with physician later this year. Patient appropriate to discharge from formal OT at this time and continue with HEP.    Objective:     AROM (degrees): Patient presents with flexed positioning of bilateral digits. Shoulder, elbow, and wrist movements WFL with compensatory strategies and tendency towards D2 extension positioning to extend digits.     MANUAL MUSCLE TESTING:   Shoulder Flexion: R=NT; L=4/5  Shoulder Abduction: R=NT; L=4/5  Shoulder External Rotation: R=NT; L=4/5  Shoulder Internal Rotation: R=NT; L=4/5  Elbow Flexion: R=NT; L=4+/5  Elbow Extension: R=NT; L=4/5  Forearm Pronation: R=NT; L=4/5  Forearm Supination: R=NT;  "Procedure: COLONOSCOPY, WITH POLYPECTOMY USING SNARE;  Surgeon: Tuyet Guillen MD;  Location: [facility] ENDOSCOPY;  Service: Gastroenterology;;    CYST REMOVAL      TUBAL LIGATION        Current Outpatient Medications   Medication Instructions    (Magic mouthwash) 1:1:1 diphenhydrAMINE(Benadryl) 12.5mg/5ml liq, aluminum & magnesium hydroxide-simethicone (Maalox), LIDOcaine viscous 2% 15 mLs, Swish & Spit, Every 4 hours PRN, for mouth sores    hyoscyamine (ANASPAZ,LEVSIN) 125 mcg, Oral, Every 6 hours PRN    milnacipran (SAVELLA) 12.5 mg (5)-25 mg(8)-50 mg(42) DsPk 12.5 mg on day 1, then 12.5 mg BID on days 2-3, 25 mg BID on days 4-7, then 50 mg BID indefinitely    milnacipran (SAVELLA) 50 mg, Oral, 2 times daily    mirabegron (MYRBETRIQ) 50 mg, Oral, Daily    norethindrone (AYGESTIN) 5 mg, Oral, Daily    oxybutynin (DITROPAN-XL) 10 mg, Oral    pantoprazole (PROTONIX) 40 mg, Oral, Every morning     Social History     Tobacco Use    Smoking status: Every Day     Current packs/day: 1.50     Average packs/day: 1.5 packs/day for 26.5 years (39.7 ttl pk-yrs)     Types: Cigarettes     Start date: 2/1/1998    Smokeless tobacco: Never   Substance Use Topics    Alcohol use: Not Currently    Drug use: Yes     Frequency: 7.0 times per week     Types: Marijuana     Comment: daily     Lives with son   Does office work    Family hx:   Sister uterine cancer  Three cousins uterine cancer  Denies hx breast cancer    Review of Systems  Pertinent items noted in HPI.    Objective:     Vitals:    07/15/24 0925   BP: 111/76   BP Location: Left arm   Patient Position: Sitting   BP Method: Large (Automatic)   Pulse: 79   Resp: 20   Temp: 98.5 °F (36.9 °C)   SpO2: 100%   Weight: 103.4 kg (228 lb)   Height: 5' 7" (1.702 m)     Body mass index is 35.71 kg/m².    Physical Exam:   General: Alert and oriented, in no acute distress  Lungs: Breathing comfortably on room air, no conversational dyspnea  Heart: Regular rate, extremities well " L=2+/5  Wrist Flexion: R=NT; L=4+/5  Wrist Extension: R=NT; L=4/5     Strength (lbs) Right Average Left Average   : 4.0 lbs 16.4 lbs   2 pt Pinch: NT NT   3 pt Pinch: NT NT   Lateral Pinch: NT NT       Goals: (to be met in 12 visits)   1. Patient will be independent with daily LUE stretching routine indicating increased ability to manage functional ADL/IADLs. MET  2. Patient will report infrequent incidence of LUE muscle spasms daily <15 indicating increased management of symptoms. MET  3. Patient will demonstrate at least 20.0 lbs or greater L  strength indicating increased ability to manage functional daily tasks. DISCONTINUE  4. Patient will be independent and compliant with comprehensive HEP to maintain progress achieved in OT. MET    Plan: Discharge from formal OT at this time and continue with established HEP and recommendations.    Patient/Family/Caregiver was advised of these findings, precautions, and treatment options and has agreed to actively participate in planning and for this course of care.    Thank you for your referral. If you have any questions, please contact me at Dept: [phone].    Sincerely,  Electronically signed by therapist: Erick Mariano, OT      Certification From: 5/23/2024  To:8/21/2024       21st Century Cures Act Notice to Patient: Medical documents like this are made available to patients in the interest of transparency. However, be advised this is a medical document and it is intended as [id number] communication between your medical providers. This medical document may contain abbreviations, assessments, medical data, and results or other terms that are unfamiliar. Medical documents are intended to carry relevant information, facts as evident, and the clinical opinion of the practitioner. As such, this medical document may be written in language that appears blunt or direct. You are encouraged to contact your medical provider and/or [facility] if  perfused  Abdomen: Soft, non-distended, non tender to palpation, no involuntary guarding, no rebound tenderness  Extremities: Atraumatic, non-edematous, no cords or calf tenderness, no significant calf/ankle edema  External genitalia: Normal female genitalia without lesion, discharge or tenderness. Normal appearing urethral meatus. Normal appearing external anus.  Bimanual Exam: Uterus 10 cm in size, anteverted, minimal descent and mobility noted. No cervical motion tenderness. No adnexal fullness/tenderness.  Speculum Exam: Vaginal mucosa pink and moist, without lesion. Scant, white discharge present in vaginal canal. Cervix well visualized, smooth in contour with no masses or lesions. Os normal in appearance without blood or discharge.   Note:  Female nurse chaperone present for entirety of exam.    Relevant Labs:   Lab Results   Component Value Date    WBC 8.26 05/14/2024    HGB 14.5 05/14/2024    HCT 43.9 05/14/2024    MCV 88.7 05/14/2024     05/14/2024     Lab Results   Component Value Date    TSH 1.089 03/13/2024 4/2024:  1. Ascending Colon polyp, biopsy:   - Adenomatous polyp(s).  - No evidence of malignancy or high grade dysplasia.         2. Rectal polyp, polypectomy:   - Hyperplastic polyp.  - No evidence of malignancy or high grade dysplasia.      Urine, bladder:  - Negative for malignancy.      3/2024 pap: NILM/HPV negative    Relevant Imaging:  EXAMINATION:  US PELVIS COMP WITH TRANSVAG NON-OB (XPD)     CLINICAL HISTORY:  Dysmenorrhea, unspecified     TECHNIQUE:  Exam is performed both transabdominally and endovaginally     COMPARISON:  None     FINDINGS:  The uterus measures 10.3 x 5.7 x 6.7 cm.  The endometrial stripe is 6 mm.  There are nabothian cyst present.  There are 2 leiomyoma of the uterus measuring 0.6 x 0.5 x 0.7 cm and 1.5 x 1.2 x 1.3 cm.     The right ovary measures 2.5 x 1.8 x 2.3 cm.  There is a simple cyst present measuring 1 x 1.3 x 1.3 cm.  There is flow present.     The  you have any questions about this medical document.   left ovary measures 2.5 x 1.8 x 2.7 cm.  There is a simple cyst present measuring 1.1 x 1 x 1.2 cm.  There is flow present.     Impression:     Simple cyst of either ovary.        Electronically signed by:Everton Marie MD  Date:                                            2024  Time:                                           17:40      Assessment:       43 y.o.  here with AUB-A and high clinical suspicion for endometriosis.     1. Endometriosis  norethindrone (AYGESTIN) 5 mg Tab      2. Abnormal uterine bleeding (AUB)  Case Request Operating Room: HYSTERECTOMY, TOTAL, LAPAROSCOPIC, SALPINGO-OOPHORECTOMY, BILATERAL, CYSTOSCOPY, DESTRUCTION, ENDOMETRIOSIS      3. Adenomyosis               Plan:         Problem List Items Addressed This Visit          Renal/    Abnormal uterine bleeding (AUB)    Relevant Orders    Case Request Operating Room: HYSTERECTOMY, TOTAL, LAPAROSCOPIC, SALPINGO-OOPHORECTOMY, BILATERAL, CYSTOSCOPY, DESTRUCTION, ENDOMETRIOSIS (Completed)    Endometriosis - Primary     Minimal improvement in pain with norethindrone 0.35 mg daily.   Will change to 5 mg Aygestin daily and monitor for improvement.   At time of TLH, plan to evaluate for endometriosis and biopsy any lesions.   Discussed hysterectomy may or may not improve pain, is possible to have microscopic implants and/or extensive disease.          Relevant Medications    norethindrone (AYGESTIN) 5 mg Tab    Adenomyosis     Prior work up reviewed including TVUS with evidence of adenomyosis.   Patient with no improvement in bleeding symptoms on norethindrone.   Patient desires definitive surgical management, will proceed with planning for hysterectomy.   Minimal descent of uterus on exam, will plan for TLH/BS/cystoscopy.             Return to clinic for preoperative appointment.     Discussed patient and plan with Dr. Sharma.     Marily Blas MD  [facility], PGY-3

## 2024-07-15 NOTE — ASSESSMENT & PLAN NOTE
Minimal improvement in pain with norethindrone 0.35 mg daily.   Will change to 5 mg Aygestin daily and monitor for improvement.   At time of TLH, plan to evaluate for endometriosis and biopsy any lesions.   Discussed hysterectomy may or may not improve pain, is possible to have microscopic implants and/or extensive disease.

## 2024-07-15 NOTE — PROGRESS NOTES
"[facility] GYNECOLOGY CLINIC NOTE     Joan Frias is a 43 y.o.  presenting to GYN clinic for 4mo follow-up for AMB.    Patient was started on norethindrone .35 mg oral daily in March and reports no improvement in heavy bleeding or irregular menses. She reported weight gain and swelling in her hands and feet that she noticed after starting on the norethindrone. She reported bleeding for 19 days in May, changing an overnight pad once/hour and frequent passing of large clots. Reports cramping associated with periods. She also reported RLQ pain associated with a "hardness", hip pain, and radiating pain down to her foot. She also noted a pain in her right throat that would radiate.    She is currently being followed by [facility] urology for dysuria and asymptomatic microscopic hematuria.    Sexually active with 1 male partner.     Gynecology  OB History          3    Para   3    Term                AB        Living             SAB        IAB        Ectopic        Multiple        Live Births                    Past Medical History:   Diagnosis Date    Abnormal uterine bleeding     Degenerative lumbar disc     Diarrhea 2023    Endometriosis, unspecified     Fibromyalgia     Mixed incontinence       Past Surgical History:   Procedure Laterality Date    ABSCESS DRAINAGE      COLONOSCOPY N/A 2024    Procedure: COLONOSCOPY;  Surgeon: Tuyet Guillen MD;  Location: [facility] ENDOSCOPY;  Service: Gastroenterology;  Laterality: N/A;    COLONOSCOPY, WITH POLYPECTOMY USING SNARE  2024    Procedure: COLONOSCOPY, WITH POLYPECTOMY USING SNARE;  Surgeon: Tuyet Guillen MD;  Location: [facility] ENDOSCOPY;  Service: Gastroenterology;;    CYST REMOVAL      TUBAL LIGATION        Current Outpatient Medications   Medication Instructions    (Magic mouthwash) 1:1:1 diphenhydrAMINE(Benadryl) 12.5mg/5ml liq, aluminum & magnesium hydroxide-simethicone (Maalox), LIDOcaine viscous 2% 15 mLs, Swish & Spit, Every 4 " "hours PRN, for mouth sores    hyoscyamine (ANASPAZ,LEVSIN) 125 mcg, Oral, Every 6 hours PRN    milnacipran (SAVELLA) 12.5 mg (5)-25 mg(8)-50 mg(42) DsPk 12.5 mg on day 1, then 12.5 mg BID on days 2-3, 25 mg BID on days 4-7, then 50 mg BID indefinitely    milnacipran (SAVELLA) 50 mg, Oral, 2 times daily    mirabegron (MYRBETRIQ) 50 mg, Oral, Daily    norethindrone (MICRONOR) 0.35 mg, Oral, Daily    oxybutynin (DITROPAN-XL) 10 mg, Oral    pantoprazole (PROTONIX) 40 mg, Oral, Every morning     Social History     Tobacco Use    Smoking status: Every Day     Current packs/day: 1.50     Average packs/day: 1.5 packs/day for 26.5 years (39.7 ttl pk-yrs)     Types: Cigarettes     Start date: 2/1/1998    Smokeless tobacco: Never   Substance Use Topics    Alcohol use: Not Currently    Drug use: Yes     Frequency: 7.0 times per week     Types: Marijuana     Comment: daily       Review of Systems  Pertinent items noted in HPI.    Objective:     Vitals:    07/15/24 0925   BP: 111/76   BP Location: Left arm   Patient Position: Sitting   BP Method: Large (Automatic)   Pulse: 79   Resp: 20   Temp: 98.5 °F (36.9 °C)   SpO2: 100%   Weight: 103.4 kg (228 lb)   Height: 5' 7" (1.702 m)     Body mass index is 35.71 kg/m².    Physical Exam:   General: Alert and oriented, in no acute distress  Lungs: Breathing comfortably on room air, no conversational dyspnea  Heart: Regular rate, extremities well perfused  Abdomen: Soft, non-distended, non tender to palpation, no involuntary guarding, no rebound tenderness  Extremities: Atraumatic, non-edematous, no cords or calf tenderness, no significant calf/ankle edema  External genitalia: Normal female genitalia without lesion, discharge or tenderness. Normal appearing urethral meatus. Normal appearing external anus.  Bimanual Exam: Uterus ***cm in size, *** anteverted, freely mobile, smooth in contour, no masses. No cervical motion tenderness. No adnexal fullness/tenderness.  Speculum Exam: Vaginal " mucosa pink and moist, without lesion. Scant, white discharge present in vaginal canal. Cervix well visualized, smooth in contour with no masses or lesions. Os normal in appearance without blood or discharge.   Note:  Female nurse chaperone present for entirety of exam.    Relevant Labs:   Lab Results   Component Value Date    WBC 8.26 2024    HGB 14.5 2024    HCT 43.9 2024    MCV 88.7 2024     2024         Relevant Imaging:  ***      Assessment:       43 y.o.  here for ***.  No diagnosis found.       Plan:         Problem List Items Addressed This Visit    None      Return to clinic ***    Discussed patient and plan with ***    Marily Blas MD  [facility], PGY-3

## 2024-07-15 NOTE — ASSESSMENT & PLAN NOTE
Prior work up reviewed including TVUS with evidence of adenomyosis.   Patient with no improvement in bleeding symptoms on norethindrone.   Patient desires definitive surgical management, will proceed with planning for hysterectomy.   Minimal descent of uterus on exam, will plan for TLH/BS/cystoscopy.

## 2024-07-30 NOTE — H&P (VIEW-ONLY)
[facility] Preoperative Visit History and Physical    HPI:   43 y.o.  with AUB-A and dysmenorrhea, has had continued heavy and prolonged bleeding  with associated cramping pelvic pain on norethindrone. She now desires definitive surgical management with hysterectomy. Patient's dysmenorrhea begins 2-3 days prior to bleeding and lasts through menses. At age 18 was diagnosed with endometriosis.  In past pain was controlled with OCPs but can no longer take due to contraindication age with tobacco use.      Past Medical History:   Diagnosis Date    Abnormal uterine bleeding     Degenerative lumbar disc     Diarrhea 2023    Endometriosis, unspecified     Fibromyalgia     Mixed incontinence      Follows with Dr. Price for incontinence. Patient interested in medical management at this time.     Past Surgical History:   Procedure Laterality Date    ABSCESS DRAINAGE      COLONOSCOPY N/A 2024    Procedure: COLONOSCOPY;  Surgeon: Tuyet Guillen MD;  Location: [facility] ENDOSCOPY;  Service: Gastroenterology;  Laterality: N/A;    COLONOSCOPY, WITH POLYPECTOMY USING SNARE  2024    Procedure: COLONOSCOPY, WITH POLYPECTOMY USING SNARE;  Surgeon: Tuyet Guillen MD;  Location: [facility] ENDOSCOPY;  Service: Gastroenterology;;    CYST REMOVAL      TUBAL LIGATION       Tailbone abscess/cyst with multiple I&Ds, cyst removal.     Current Outpatient Medications   Medication Instructions    (Magic mouthwash) 1:1:1 diphenhydrAMINE(Benadryl) 12.5mg/5ml liq, aluminum & magnesium hydroxide-simethicone (Maalox), LIDOcaine viscous 2% 15 mLs, Swish & Spit, Every 4 hours PRN, for mouth sores    hyoscyamine (ANASPAZ,LEVSIN) 125 mcg, Oral, Every 6 hours PRN    milnacipran (SAVELLA) 12.5 mg (5)-25 mg(8)-50 mg(42) DsPk 12.5 mg on day 1, then 12.5 mg BID on days 2-3, 25 mg BID on days 4-7, then 50 mg BID indefinitely    milnacipran (SAVELLA) 50 mg, Oral, 2 times daily    mirabegron (MYRBETRIQ) 50 mg, Oral, Daily     norethindrone (AYGESTIN) 5 mg, Oral, Daily    oxybutynin (DITROPAN-XL) 10 mg, Oral    pantoprazole (PROTONIX) 40 mg, Oral, Every morning       OB History    Para Term  AB Living   3 3           SAB IAB Ectopic Multiple Live Births                  # Outcome Date GA Lbr Aidan/2nd Weight Sex Type Anes PTL Lv   3 Para            2 Para            1 Para               Obstetric Comments    x3     GynHx:  Contraception: s/p BTL  Pap Hx: denies abnormal hx  STI Hx: at age 29, unsure of which one, s/p treatment    Family History   Problem Relation Name Age of Onset    Hypertension Mother Rhina linda     Hyperlipidemia Mother Rhina linda     Stroke Mother Rhina linda     Uterine cancer Sister      Ulcerative colitis Paternal Uncle Franco, Juan     Heart attack Maternal Grandfather      Cancer Maternal Grandfather      Cystic fibrosis Paternal Grandmother Fauzia linda     COPD Paternal Grandmother Fauzia linda     Heart attack Paternal Grandfather      Diabetes Paternal Grandfather       Sister with endometrial cancer at age 38  Denies family hx of colon, breast, ovarian    Social History     Socioeconomic History    Marital status: Legally    Tobacco Use    Smoking status: Every Day     Current packs/day: 1.50     Average packs/day: 1.5 packs/day for 26.5 years (39.7 ttl pk-yrs)     Types: Cigarettes     Start date: 1998    Smokeless tobacco: Never   Substance and Sexual Activity    Alcohol use: Not Currently    Drug use: Yes     Frequency: 7.0 times per week     Types: Marijuana     Comment: daily    Sexual activity: Yes     Partners: Male     Birth control/protection: None     Current tobacco and marijuana use  Denies current alcohol use  Lives with son, patient's mother to help after surgery  Work: office work    Review of patient's allergies indicates:   Allergen Reactions    Codeine-cpm-pe-acetaminophen Anaphylaxis     Throat edema    Pcn [penicillins] Swelling     Review of Systems: As stated  "in HPI.      Objective:     Vitals:    07/31/24 0829   BP: 124/86   BP Location: Right arm   Patient Position: Sitting   BP Method: Large (Automatic)   Pulse: 90   Resp: 18   Temp: 98.4 °F (36.9 °C)   SpO2: 99%   Weight: 99.8 kg (220 lb)   Height: 5' 7" (1.702 m)     Body mass index is 34.46 kg/m².    Physical Exam:   General: Alert and oriented, in no acute distress  Lungs: Clear to auscultation bilaterally, no conversational dyspnea  Heart: Regular rate and rhythm  Abdomen: Soft, non-distended, non tender to palpation, no involuntary guarding, no rebound tenderness  Extremities: Atraumatic, non-edematous, no cords or calf tenderness, no significant calf/ankle edema    Performed with Dr Blas   External genitalia: Normal female genitalia without lesion, discharge or tenderness. Normal appearing urethral meatus. Normal appearing external anus.  Bimanual Exam: Uterus 10 cm in size, anteverted, minimal descent and mobility noted. No cervical motion tenderness. No adnexal fullness/tenderness.  Speculum Exam: Vaginal mucosa pink and moist, without lesion. Scant, white discharge present in vaginal canal. Cervix well visualized, smooth in contour with no masses or lesions. Os normal in appearance without blood or discharge.    LABS:  Pap 3/2024: NILM/HPV negative   UPT: negative    Lab Results   Component Value Date    WBC 8.26 05/14/2024    HGB 14.5 05/14/2024    HCT 43.9 05/14/2024    MCV 88.7 05/14/2024     05/14/2024     Lab Results   Component Value Date    TSH 1.089 03/13/2024       IMAGING:  EXAMINATION:  US PELVIS COMP WITH TRANSVAG NON-OB (XPD)     CLINICAL HISTORY:  Dysmenorrhea, unspecified     TECHNIQUE:  Exam is performed both transabdominally and endovaginally     COMPARISON:  None     FINDINGS:  The uterus measures 10.3 x 5.7 x 6.7 cm.  The endometrial stripe is 6 mm.  There are nabothian cyst present.  There are 2 leiomyoma of the uterus measuring 0.6 x 0.5 x 0.7 cm and 1.5 x 1.2 x 1.3 cm.  The " right ovary measures 2.5 x 1.8 x 2.3 cm.  There is a simple cyst present measuring 1 x 1.3 x 1.3 cm.  There is flow present.  The left ovary measures 2.5 x 1.8 x 2.7 cm.  There is a simple cyst present measuring 1.1 x 1 x 1.2 cm.  There is flow present.     Impression:  Simple cyst of either ovary.        Electronically signed by:Everton Marie MD  Date:                                            2024  Time:                                           17:40      Procedures:     EMB Procedure Note    Date of procedure:  2024    Procedure(s):   EMB    Indication: abnormal uterine bleeding     Procedure technique:     Joan Frias was counseled on risks, benefits, alternatives to her procedure today. A consent form was reviewed and signed. All questions were answered to her satisfaction.    After discussing and obtaining informed consent, a time out was performed confirming her identity, surgical site, planned procedure and that all necessary equipment was available. She was positioned on the exam table in dorsal lithotomy position.   Speculum was placed with excellent visualization of the cervix. Cervix swabbed with betadine x 3. Single tooth tenaculum used to grasp the anterior lip of the cervix.  2 passes performed with adequate tissue return noted.  Specimen labeled and sent to pathology.  All instruments removed.  Tenaculum site hemostatic. Patient tolerated the procedure well.  Minimal EBL.  No complications.      Assessment:      43 y.o.  with AUB-A and dysmenorrhea, desiring definitive surgical management.    Problem List Items Addressed This Visit    None  Visit Diagnoses       Preop testing    -  Primary    Relevant Orders    POCT Urine Pregnancy    Type & Screen            Plan:     AUB: EB Performed today to complete AUB work up in setting of obesity. Will review results prior to proceeding with surgery.  Counseling: Alternatives to this planned procedure were explained to the patient including  expectant, medical and other types of surgical management. This procedure and its risks, reasons, benefits and complications (including injury to bowel, bladder, major blood vessel, ureter, bleeding, possibility of transfusion, infection, scarring, dyspareunia, erosion, further surgery, worsening incontinence, failure of the procedure or fistula formation) were reviewed in detail.    Dysmennorhea: discussed that differential for pain includes endometriosis. If endometriosis is present, chronic inflammation may lead to distortion of anatomy and subsequent difficult dissection, increasing her risk for injury to bowel, bladder, ureter, and any other nearby tissue. Also discussed that if patient is found to have lesions suspicious for endometriosis at time of abdominal survey during surgery, it may be possible to take biopsies and remove or destroy abnormal tissue. This may include ovarian cysts and/or abnormal appendix. Discussed risks and benefits of these procedures and patient would like to proceed with all indicated procedures. Patient consented for these procedures.   We have reviewed the typical perioperative course and post-operative precautions and restrictions have been reviewed.    Additionally, ovarian conservation versus elective risk reducing ovarian resection were discussed with the patient.  She understands the risk for reoperation for ovarian etiology to be 5-10%, the risk for ovarian cancer in women to be 1 in 70, and the protective effects of ovarian hormones including cardiovascular and bone health.  After discussion, the patient desires ovarian conservation with the understanding that if any disease is suspected or bleeding that necessitates it - they may be removed.   Patient counseled on the fact that cystotomy complicates approximately 0.3 to 11/1000 benign gynecologic surgeries, especially urogynecologic procedures and hysterectomy.  Patient is aware that, in the event of cystotomy, continuous  bladder drainage will be continued for 7-10 days with Baron catheter in place.   Counseled on ureteral injury rates of 0.2-7.3%, bowel injury rates of <1%.   Transfusion of blood and blood products discussed. Patient was consented for blood transfusion in the case of an emergency.  She understands the possibility of blood transfusion reaction and the attendant risk of transmission of HIV & Hepatitis C to be 1 in 2 million and the risk of Hepatitis B to be 1 in 200,000.  She is aware of the possibility of transfusion reaction. All questions were answered.   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  She has consented to an exam under anesthesia and understands that medical students participate in this portion of the procedure.  All questions were answered.    Preop testing ordered. Surgical consents signed.  Instructions reviewed, including NPO after midnight.   Counseled to hold the following medications on the day of surgery: per anesthesia  Current contraception: BTL    To OR for TLH/BS/cystoscopy with Dr. Méndez.   Scheduled on 8/15/2024    Discussed with Dr. Sharma.     Marily Blas MD  [employer], PGY-3

## 2024-07-30 NOTE — PROGRESS NOTES
[facility] Preoperative Visit History and Physical    HPI:   43 y.o.  with AUB-A and dysmenorrhea, has had continued heavy and prolonged bleeding  with associated cramping pelvic pain on norethindrone. She now desires definitive surgical management with hysterectomy. Patient's dysmenorrhea begins 2-3 days prior to bleeding and lasts through menses. At age 18 was diagnosed with endometriosis.  In past pain was controlled with OCPs but can no longer take due to contraindication age with tobacco use.      Past Medical History:   Diagnosis Date    Abnormal uterine bleeding     Degenerative lumbar disc     Diarrhea 2023    Endometriosis, unspecified     Fibromyalgia     Mixed incontinence      Follows with Dr. Price for incontinence. Patient interested in medical management at this time.     Past Surgical History:   Procedure Laterality Date    ABSCESS DRAINAGE      COLONOSCOPY N/A 2024    Procedure: COLONOSCOPY;  Surgeon: Tuyet Guillen MD;  Location: [facility] ENDOSCOPY;  Service: Gastroenterology;  Laterality: N/A;    COLONOSCOPY, WITH POLYPECTOMY USING SNARE  2024    Procedure: COLONOSCOPY, WITH POLYPECTOMY USING SNARE;  Surgeon: Tuyet Guillen MD;  Location: [facility] ENDOSCOPY;  Service: Gastroenterology;;    CYST REMOVAL      TUBAL LIGATION       Tailbone abscess/cyst with multiple I&Ds, cyst removal.     Current Outpatient Medications   Medication Instructions    (Magic mouthwash) 1:1:1 diphenhydrAMINE(Benadryl) 12.5mg/5ml liq, aluminum & magnesium hydroxide-simethicone (Maalox), LIDOcaine viscous 2% 15 mLs, Swish & Spit, Every 4 hours PRN, for mouth sores    hyoscyamine (ANASPAZ,LEVSIN) 125 mcg, Oral, Every 6 hours PRN    milnacipran (SAVELLA) 12.5 mg (5)-25 mg(8)-50 mg(42) DsPk 12.5 mg on day 1, then 12.5 mg BID on days 2-3, 25 mg BID on days 4-7, then 50 mg BID indefinitely    milnacipran (SAVELLA) 50 mg, Oral, 2 times daily    mirabegron (MYRBETRIQ) 50 mg, Oral, Daily     norethindrone (AYGESTIN) 5 mg, Oral, Daily    oxybutynin (DITROPAN-XL) 10 mg, Oral    pantoprazole (PROTONIX) 40 mg, Oral, Every morning       OB History    Para Term  AB Living   3 3           SAB IAB Ectopic Multiple Live Births                  # Outcome Date GA Lbr Aidan/2nd Weight Sex Type Anes PTL Lv   3 Para            2 Para            1 Para               Obstetric Comments    x3     GynHx:  Contraception: s/p BTL  Pap Hx: denies abnormal hx  STI Hx: at age 29, unsure of which one, s/p treatment    Family History   Problem Relation Name Age of Onset    Hypertension Mother Rhina linda     Hyperlipidemia Mother Rhina linda     Stroke Mother Rhina linda     Uterine cancer Sister      Ulcerative colitis Paternal Uncle Franco, Juan     Heart attack Maternal Grandfather      Cancer Maternal Grandfather      Cystic fibrosis Paternal Grandmother Fauzia linda     COPD Paternal Grandmother Fauzia linda     Heart attack Paternal Grandfather      Diabetes Paternal Grandfather       Sister with endometrial cancer at age 38  Denies family hx of colon, breast, ovarian    Social History     Socioeconomic History    Marital status: Legally    Tobacco Use    Smoking status: Every Day     Current packs/day: 1.50     Average packs/day: 1.5 packs/day for 26.5 years (39.7 ttl pk-yrs)     Types: Cigarettes     Start date: 1998    Smokeless tobacco: Never   Substance and Sexual Activity    Alcohol use: Not Currently    Drug use: Yes     Frequency: 7.0 times per week     Types: Marijuana     Comment: daily    Sexual activity: Yes     Partners: Male     Birth control/protection: None     Current tobacco and marijuana use  Denies current alcohol use  Lives with son, patient's mother to help after surgery  Work: office work    Review of patient's allergies indicates:   Allergen Reactions    Codeine-cpm-pe-acetaminophen Anaphylaxis     Throat edema    Pcn [penicillins] Swelling     Review of Systems: As stated  "in HPI.      Objective:     Vitals:    07/31/24 0829   BP: 124/86   BP Location: Right arm   Patient Position: Sitting   BP Method: Large (Automatic)   Pulse: 90   Resp: 18   Temp: 98.4 °F (36.9 °C)   SpO2: 99%   Weight: 99.8 kg (220 lb)   Height: 5' 7" (1.702 m)     Body mass index is 34.46 kg/m².    Physical Exam:   General: Alert and oriented, in no acute distress  Lungs: Clear to auscultation bilaterally, no conversational dyspnea  Heart: Regular rate and rhythm  Abdomen: Soft, non-distended, non tender to palpation, no involuntary guarding, no rebound tenderness  Extremities: Atraumatic, non-edematous, no cords or calf tenderness, no significant calf/ankle edema    Performed with Dr Blas   External genitalia: Normal female genitalia without lesion, discharge or tenderness. Normal appearing urethral meatus. Normal appearing external anus.  Bimanual Exam: Uterus 10 cm in size, anteverted, minimal descent and mobility noted. No cervical motion tenderness. No adnexal fullness/tenderness.  Speculum Exam: Vaginal mucosa pink and moist, without lesion. Scant, white discharge present in vaginal canal. Cervix well visualized, smooth in contour with no masses or lesions. Os normal in appearance without blood or discharge.    LABS:  Pap 3/2024: NILM/HPV negative   UPT: negative    Lab Results   Component Value Date    WBC 8.26 05/14/2024    HGB 14.5 05/14/2024    HCT 43.9 05/14/2024    MCV 88.7 05/14/2024     05/14/2024     Lab Results   Component Value Date    TSH 1.089 03/13/2024       IMAGING:  EXAMINATION:  US PELVIS COMP WITH TRANSVAG NON-OB (XPD)     CLINICAL HISTORY:  Dysmenorrhea, unspecified     TECHNIQUE:  Exam is performed both transabdominally and endovaginally     COMPARISON:  None     FINDINGS:  The uterus measures 10.3 x 5.7 x 6.7 cm.  The endometrial stripe is 6 mm.  There are nabothian cyst present.  There are 2 leiomyoma of the uterus measuring 0.6 x 0.5 x 0.7 cm and 1.5 x 1.2 x 1.3 cm.  The " right ovary measures 2.5 x 1.8 x 2.3 cm.  There is a simple cyst present measuring 1 x 1.3 x 1.3 cm.  There is flow present.  The left ovary measures 2.5 x 1.8 x 2.7 cm.  There is a simple cyst present measuring 1.1 x 1 x 1.2 cm.  There is flow present.     Impression:  Simple cyst of either ovary.        Electronically signed by:Everton Marie MD  Date:                                            2024  Time:                                           17:40      Procedures:     EMB Procedure Note    Date of procedure:  2024    Procedure(s):   EMB    Indication: abnormal uterine bleeding     Procedure technique:     Joan Frias was counseled on risks, benefits, alternatives to her procedure today. A consent form was reviewed and signed. All questions were answered to her satisfaction.    After discussing and obtaining informed consent, a time out was performed confirming her identity, surgical site, planned procedure and that all necessary equipment was available. She was positioned on the exam table in dorsal lithotomy position.   Speculum was placed with excellent visualization of the cervix. Cervix swabbed with betadine x 3. Single tooth tenaculum used to grasp the anterior lip of the cervix.  2 passes performed with adequate tissue return noted.  Specimen labeled and sent to pathology.  All instruments removed.  Tenaculum site hemostatic. Patient tolerated the procedure well.  Minimal EBL.  No complications.      Assessment:      43 y.o.  with AUB-A and dysmenorrhea, desiring definitive surgical management.    Problem List Items Addressed This Visit    None  Visit Diagnoses       Preop testing    -  Primary    Relevant Orders    POCT Urine Pregnancy    Type & Screen            Plan:     AUB: EB Performed today to complete AUB work up in setting of obesity. Will review results prior to proceeding with surgery.  Counseling: Alternatives to this planned procedure were explained to the patient including  expectant, medical and other types of surgical management. This procedure and its risks, reasons, benefits and complications (including injury to bowel, bladder, major blood vessel, ureter, bleeding, possibility of transfusion, infection, scarring, dyspareunia, erosion, further surgery, worsening incontinence, failure of the procedure or fistula formation) were reviewed in detail.    Dysmennorhea: discussed that differential for pain includes endometriosis. If endometriosis is present, chronic inflammation may lead to distortion of anatomy and subsequent difficult dissection, increasing her risk for injury to bowel, bladder, ureter, and any other nearby tissue. Also discussed that if patient is found to have lesions suspicious for endometriosis at time of abdominal survey during surgery, it may be possible to take biopsies and remove or destroy abnormal tissue. This may include ovarian cysts and/or abnormal appendix. Discussed risks and benefits of these procedures and patient would like to proceed with all indicated procedures. Patient consented for these procedures.   We have reviewed the typical perioperative course and post-operative precautions and restrictions have been reviewed.    Additionally, ovarian conservation versus elective risk reducing ovarian resection were discussed with the patient.  She understands the risk for reoperation for ovarian etiology to be 5-10%, the risk for ovarian cancer in women to be 1 in 70, and the protective effects of ovarian hormones including cardiovascular and bone health.  After discussion, the patient desires ovarian conservation with the understanding that if any disease is suspected or bleeding that necessitates it - they may be removed.   Patient counseled on the fact that cystotomy complicates approximately 0.3 to 11/1000 benign gynecologic surgeries, especially urogynecologic procedures and hysterectomy.  Patient is aware that, in the event of cystotomy, continuous  bladder drainage will be continued for 7-10 days with Baron catheter in place.   Counseled on ureteral injury rates of 0.2-7.3%, bowel injury rates of <1%.   Transfusion of blood and blood products discussed. Patient was consented for blood transfusion in the case of an emergency.  She understands the possibility of blood transfusion reaction and the attendant risk of transmission of HIV & Hepatitis C to be 1 in 2 million and the risk of Hepatitis B to be 1 in 200,000.  She is aware of the possibility of transfusion reaction. All questions were answered.   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  She has consented to an exam under anesthesia and understands that medical students participate in this portion of the procedure.  All questions were answered.    Preop testing ordered. Surgical consents signed.  Instructions reviewed, including NPO after midnight.   Counseled to hold the following medications on the day of surgery: per anesthesia  Current contraception: BTL    To OR for TLH/BS/cystoscopy with Dr. Méndez.   Scheduled on 8/15/2024    Discussed with Dr. Sharma.     Marily Blas MD  [employer] [facility], PGY-3

## 2024-07-31 ENCOUNTER — OFFICE VISIT (OUTPATIENT)
Dept: GYNECOLOGY | Facility: CLINIC | Age: 44
End: 2024-07-31
Payer: MEDICAID

## 2024-07-31 ENCOUNTER — LAB VISIT (OUTPATIENT)
Dept: LAB | Facility: HOSPITAL | Age: 44
End: 2024-07-31
Payer: MEDICAID

## 2024-07-31 ENCOUNTER — ANESTHESIA EVENT (OUTPATIENT)
Dept: SURGERY | Facility: HOSPITAL | Age: 44
End: 2024-07-31
Payer: MEDICAID

## 2024-07-31 VITALS
OXYGEN SATURATION: 99 % | DIASTOLIC BLOOD PRESSURE: 86 MMHG | BODY MASS INDEX: 34.53 KG/M2 | SYSTOLIC BLOOD PRESSURE: 124 MMHG | WEIGHT: 220 LBS | HEIGHT: 67 IN | HEART RATE: 90 BPM | TEMPERATURE: 98 F | RESPIRATION RATE: 18 BRPM

## 2024-07-31 DIAGNOSIS — N80.03 ADENOMYOSIS: ICD-10-CM

## 2024-07-31 DIAGNOSIS — Z01.818 PREOP TESTING: ICD-10-CM

## 2024-07-31 DIAGNOSIS — Z01.818 PREOP TESTING: Primary | ICD-10-CM

## 2024-07-31 DIAGNOSIS — N94.6 DYSMENORRHEA: ICD-10-CM

## 2024-07-31 DIAGNOSIS — N93.9 ABNORMAL UTERINE BLEEDING (AUB): ICD-10-CM

## 2024-07-31 LAB
B-HCG UR QL: NEGATIVE
CTP QC/QA: YES
GROUP & RH: NORMAL
INDIRECT COOMBS: NORMAL
SPECIMEN OUTDATE: NORMAL

## 2024-07-31 PROCEDURE — 86900 BLOOD TYPING SEROLOGIC ABO: CPT

## 2024-07-31 PROCEDURE — 86901 BLOOD TYPING SEROLOGIC RH(D): CPT

## 2024-07-31 PROCEDURE — 58100 BIOPSY OF UTERUS LINING: CPT | Mod: PBBFAC

## 2024-07-31 PROCEDURE — 88305 TISSUE EXAM BY PATHOLOGIST: CPT | Mod: TC

## 2024-07-31 PROCEDURE — 86850 RBC ANTIBODY SCREEN: CPT

## 2024-07-31 PROCEDURE — 99214 OFFICE O/P EST MOD 30 MIN: CPT | Mod: PBBFAC,25

## 2024-07-31 PROCEDURE — 81025 URINE PREGNANCY TEST: CPT | Mod: PBBFAC

## 2024-07-31 PROCEDURE — 36415 COLL VENOUS BLD VENIPUNCTURE: CPT

## 2024-07-31 NOTE — ANESTHESIA PREPROCEDURE EVALUATION
Joan Frias is a 43 y.o. female PRESENTING FOR HYSTERECTOMY, TOTAL, LAPAROSCOPIC/SALPINGO-OOPHORECTOMY, BILATERAL (Bilateral: Abdomen)/CYSTOSCOPY/DESTRUCTION, ENDOMETRIOSIS (Abdomen) with a history of   -AUB/ENDOMETRIOSIS    -FIBROMYALGIA  -CHRONIC BACK PAIN  -SMOKER  -MARIJUANA USER  -GERD  -OBESITY, BMI 34    BETA-BLOCKER: NONE    New Orders for Anesthesia: UPT    Patient Active Problem List   Diagnosis    Hematochezia    Diarrhea    Abdominal pain    Tobacco user    Fibromyalgia    Abnormal uterine bleeding (AUB)    Endometriosis    Urinary incontinence, mixed    Cervical cancer screening    Adenomyosis       Pre-op Assessment    I have reviewed the NPO Status.      Review of Systems  Anesthesia Hx:  No problems with previous Anesthesia                Social:  Smoker, Recreational Drugs       Cardiovascular:  Cardiovascular Normal                                            Pulmonary:  Pulmonary Normal                       Renal/:  Renal/ Normal                 Hepatic/GI:     GERD, well controlled             Neurological:  Neurology Normal                                      Endocrine:        Obesity / BMI > 30    Vitals:    08/15/24 0723 08/15/24 0730   BP:  134/77   Pulse:  82   Resp:  18   Temp:  36.8 °C (98.2 °F)   TempSrc:  Oral   SpO2:  98%   Weight: 101.6 kg (224 lb)          Physical Exam  General: Alert, Cooperative and Well nourished    Airway:  Mallampati: II   Mouth Opening: Normal  TM Distance: Normal  Tongue: Normal  Neck ROM: Normal ROM    Dental:  Dentures    Chest/Lungs:  Normal Respiratory Rate, Clear to auscultation    Heart:  Rate: Normal  Rhythm: Regular Rhythm  Sounds: Normal      Lab Results   Component Value Date    WBC 10.44 08/15/2024    HGB 13.2 08/15/2024    HCT 39.6 08/15/2024    MCV 88.4 08/15/2024     08/15/2024      Latest Reference Range & Units 08/15/24 07:30   POCT Glucose 70 - 110 mg/dL 106         CMP  Sodium   Date Value Ref Range Status   05/14/2024 141 136 -  145 mmol/L Final     Potassium   Date Value Ref Range Status   05/14/2024 4.5 3.5 - 5.1 mmol/L Final     Chloride   Date Value Ref Range Status   05/14/2024 108 (H) 98 - 107 mmol/L Final     CO2   Date Value Ref Range Status   05/14/2024 26 22 - 29 mmol/L Final     Blood Urea Nitrogen   Date Value Ref Range Status   05/14/2024 5.5 (L) 7.0 - 18.7 mg/dL Final     Creatinine   Date Value Ref Range Status   05/14/2024 0.74 0.55 - 1.02 mg/dL Final     Calcium   Date Value Ref Range Status   05/14/2024 9.7 8.4 - 10.2 mg/dL Final     Albumin   Date Value Ref Range Status   05/14/2024 4.3 3.5 - 5.0 g/dL Final     Bilirubin Total   Date Value Ref Range Status   05/14/2024 0.3 <=1.5 mg/dL Final     ALP   Date Value Ref Range Status   05/14/2024 72 40 - 150 unit/L Final     AST   Date Value Ref Range Status   05/14/2024 22 5 - 34 unit/L Final     ALT   Date Value Ref Range Status   05/14/2024 30 0 - 55 unit/L Final     eGFR   Date Value Ref Range Status   05/14/2024 >60 mL/min/1.73/m2 Final           Anesthesia Plan  Type of Anesthesia, risks & benefits discussed:    Anesthesia Type: Gen ETT  Intra-op Monitoring Plan: Standard ASA Monitors  Post Op Pain Control Plan: IV/PO Opioids PRN  Induction:  IV  Airway Plan: Direct  Informed Consent: Informed consent signed with the Patient and all parties understand the risks and agree with anesthesia plan.  All questions answered.   ASA Score: 2  Day of Surgery Review of History & Physical: H&P Update referred to the surgeon/provider.    Ready For Surgery From Anesthesia Perspective.     .

## 2024-08-01 ENCOUNTER — TELEPHONE (OUTPATIENT)
Dept: GYNECOLOGY | Facility: CLINIC | Age: 44
End: 2024-08-01
Payer: MEDICAID

## 2024-08-01 ENCOUNTER — PATIENT MESSAGE (OUTPATIENT)
Dept: GYNECOLOGY | Facility: CLINIC | Age: 44
End: 2024-08-01
Payer: MEDICAID

## 2024-08-01 LAB
ESTROGEN SERPL-MCNC: NORMAL PG/ML
INSULIN SERPL-ACNC: NORMAL U[IU]/ML
LAB AP CLINICAL INFORMATION: NORMAL
LAB AP GROSS DESCRIPTION: NORMAL
LAB AP REPORT FOOTNOTES: NORMAL

## 2024-08-01 NOTE — TELEPHONE ENCOUNTER
MD attempted to contact patient to discuss results of EMB, no answer, left VM.     Marily Blas MD  [facility], PGY-3

## 2024-08-09 ENCOUNTER — TELEPHONE (OUTPATIENT)
Dept: GYNECOLOGY | Facility: CLINIC | Age: 44
End: 2024-08-09
Payer: MEDICAID

## 2024-08-09 DIAGNOSIS — R19.7 DIARRHEA, UNSPECIFIED TYPE: ICD-10-CM

## 2024-08-09 DIAGNOSIS — K92.1 HEMATOCHEZIA: ICD-10-CM

## 2024-08-09 DIAGNOSIS — R10.9 RIGHT SIDED ABDOMINAL PAIN: ICD-10-CM

## 2024-08-09 RX ORDER — HYOSCYAMINE SULFATE 0.125 MG
125 TABLET ORAL EVERY 6 HOURS PRN
Qty: 90 TABLET | Refills: 0 | Status: SHIPPED | OUTPATIENT
Start: 2024-08-09

## 2024-08-13 RX ORDER — GABAPENTIN 100 MG/1
200 CAPSULE ORAL
OUTPATIENT
Start: 2024-08-13 | End: 2024-08-13

## 2024-08-14 ENCOUNTER — PATIENT MESSAGE (OUTPATIENT)
Dept: SURGERY | Facility: HOSPITAL | Age: 44
End: 2024-08-14
Payer: MEDICAID

## 2024-08-15 ENCOUNTER — HOSPITAL ENCOUNTER (OUTPATIENT)
Facility: HOSPITAL | Age: 44
Discharge: HOME | End: 2024-08-15
Attending: OBSTETRICS & GYNECOLOGY | Admitting: STUDENT IN AN ORGANIZED HEALTH CARE EDUCATION/TRAINING PROGRAM
Payer: MEDICAID

## 2024-08-15 ENCOUNTER — ANESTHESIA (OUTPATIENT)
Dept: SURGERY | Facility: HOSPITAL | Age: 44
End: 2024-08-15
Payer: MEDICAID

## 2024-08-15 DIAGNOSIS — N80.03 ADENOMYOSIS: Primary | ICD-10-CM

## 2024-08-15 DIAGNOSIS — Z90.710 STATUS POST LAPAROSCOPIC HYSTERECTOMY: ICD-10-CM

## 2024-08-15 DIAGNOSIS — N93.9 ABNORMAL UTERINE BLEEDING (AUB): ICD-10-CM

## 2024-08-15 LAB
B-HCG UR QL: NEGATIVE
BASOPHILS # BLD AUTO: 0.05 X10(3)/MCL
BASOPHILS NFR BLD AUTO: 0.5 %
CTP QC/QA: YES
EOSINOPHIL # BLD AUTO: 0.17 X10(3)/MCL (ref 0–0.9)
EOSINOPHIL NFR BLD AUTO: 1.6 %
ERYTHROCYTE [DISTWIDTH] IN BLOOD BY AUTOMATED COUNT: 14.1 % (ref 11.5–17)
GROUP & RH: NORMAL
HCT VFR BLD AUTO: 39.6 % (ref 37–47)
HGB BLD-MCNC: 13.2 G/DL (ref 12–16)
IMM GRANULOCYTES # BLD AUTO: 0.02 X10(3)/MCL (ref 0–0.04)
IMM GRANULOCYTES NFR BLD AUTO: 0.2 %
INDIRECT COOMBS: NORMAL
LYMPHOCYTES # BLD AUTO: 3.24 X10(3)/MCL (ref 0.6–4.6)
LYMPHOCYTES NFR BLD AUTO: 31 %
MCH RBC QN AUTO: 29.5 PG (ref 27–31)
MCHC RBC AUTO-ENTMCNC: 33.3 G/DL (ref 33–36)
MCV RBC AUTO: 88.4 FL (ref 80–94)
MONOCYTES # BLD AUTO: 0.64 X10(3)/MCL (ref 0.1–1.3)
MONOCYTES NFR BLD AUTO: 6.1 %
NEUTROPHILS # BLD AUTO: 6.32 X10(3)/MCL (ref 2.1–9.2)
NEUTROPHILS NFR BLD AUTO: 60.6 %
NRBC BLD AUTO-RTO: 0 %
PLATELET # BLD AUTO: 230 X10(3)/MCL (ref 130–400)
PMV BLD AUTO: 10.6 FL (ref 7.4–10.4)
POCT GLUCOSE: 106 MG/DL (ref 70–110)
RBC # BLD AUTO: 4.48 X10(6)/MCL (ref 4.2–5.4)
SPECIMEN OUTDATE: NORMAL
WBC # BLD AUTO: 10.44 X10(3)/MCL (ref 4.5–11.5)

## 2024-08-15 PROCEDURE — 71000015 HC POSTOP RECOV 1ST HR: Performed by: STUDENT IN AN ORGANIZED HEALTH CARE EDUCATION/TRAINING PROGRAM

## 2024-08-15 PROCEDURE — 37000008 HC ANESTHESIA 1ST 15 MINUTES: Performed by: STUDENT IN AN ORGANIZED HEALTH CARE EDUCATION/TRAINING PROGRAM

## 2024-08-15 PROCEDURE — 88309 TISSUE EXAM BY PATHOLOGIST: CPT | Mod: TC | Performed by: STUDENT IN AN ORGANIZED HEALTH CARE EDUCATION/TRAINING PROGRAM

## 2024-08-15 PROCEDURE — 25000003 PHARM REV CODE 250

## 2024-08-15 PROCEDURE — 37000009 HC ANESTHESIA EA ADD 15 MINS: Performed by: STUDENT IN AN ORGANIZED HEALTH CARE EDUCATION/TRAINING PROGRAM

## 2024-08-15 PROCEDURE — 36000711: Performed by: STUDENT IN AN ORGANIZED HEALTH CARE EDUCATION/TRAINING PROGRAM

## 2024-08-15 PROCEDURE — 86901 BLOOD TYPING SEROLOGIC RH(D): CPT

## 2024-08-15 PROCEDURE — 81025 URINE PREGNANCY TEST: CPT | Performed by: NURSE PRACTITIONER

## 2024-08-15 PROCEDURE — 25000003 PHARM REV CODE 250: Performed by: NURSE ANESTHETIST, CERTIFIED REGISTERED

## 2024-08-15 PROCEDURE — 86900 BLOOD TYPING SEROLOGIC ABO: CPT

## 2024-08-15 PROCEDURE — 63600175 PHARM REV CODE 636 W HCPCS: Mod: JZ,JG

## 2024-08-15 PROCEDURE — 25000003 PHARM REV CODE 250: Performed by: ANESTHESIOLOGY

## 2024-08-15 PROCEDURE — 36000710: Performed by: STUDENT IN AN ORGANIZED HEALTH CARE EDUCATION/TRAINING PROGRAM

## 2024-08-15 PROCEDURE — 27201423 OPTIME MED/SURG SUP & DEVICES STERILE SUPPLY: Performed by: STUDENT IN AN ORGANIZED HEALTH CARE EDUCATION/TRAINING PROGRAM

## 2024-08-15 PROCEDURE — 63600175 PHARM REV CODE 636 W HCPCS: Performed by: ANESTHESIOLOGY

## 2024-08-15 PROCEDURE — 25000003 PHARM REV CODE 250: Performed by: STUDENT IN AN ORGANIZED HEALTH CARE EDUCATION/TRAINING PROGRAM

## 2024-08-15 PROCEDURE — 71000033 HC RECOVERY, INTIAL HOUR: Performed by: STUDENT IN AN ORGANIZED HEALTH CARE EDUCATION/TRAINING PROGRAM

## 2024-08-15 PROCEDURE — 71000016 HC POSTOP RECOV ADDL HR: Performed by: STUDENT IN AN ORGANIZED HEALTH CARE EDUCATION/TRAINING PROGRAM

## 2024-08-15 PROCEDURE — 63600175 PHARM REV CODE 636 W HCPCS: Performed by: NURSE ANESTHETIST, CERTIFIED REGISTERED

## 2024-08-15 PROCEDURE — 85025 COMPLETE CBC W/AUTO DIFF WBC: CPT

## 2024-08-15 RX ORDER — GABAPENTIN 300 MG/1
300 CAPSULE ORAL 3 TIMES DAILY
Qty: 30 CAPSULE | Refills: 0 | Status: SHIPPED | OUTPATIENT
Start: 2024-08-15 | End: 2025-08-15

## 2024-08-15 RX ORDER — ACETAMINOPHEN 500 MG
500 TABLET ORAL EVERY 6 HOURS PRN
Qty: 60 TABLET | Refills: 0 | Status: SHIPPED | OUTPATIENT
Start: 2024-08-15

## 2024-08-15 RX ORDER — MEPERIDINE HYDROCHLORIDE 25 MG/ML
12.5 INJECTION INTRAMUSCULAR; INTRAVENOUS; SUBCUTANEOUS EVERY 10 MIN PRN
Status: DISCONTINUED | OUTPATIENT
Start: 2024-08-15 | End: 2024-08-15 | Stop reason: HOSPADM

## 2024-08-15 RX ORDER — KETAMINE HCL IN 0.9 % NACL 50 MG/5 ML
SYRINGE (ML) INTRAVENOUS
Status: DISCONTINUED | OUTPATIENT
Start: 2024-08-15 | End: 2024-08-15

## 2024-08-15 RX ORDER — LABETALOL HYDROCHLORIDE 5 MG/ML
INJECTION, SOLUTION INTRAVENOUS
Status: DISCONTINUED | OUTPATIENT
Start: 2024-08-15 | End: 2024-08-15

## 2024-08-15 RX ORDER — SODIUM CHLORIDE 0.9 % (FLUSH) 0.9 %
10 SYRINGE (ML) INJECTION
Status: DISCONTINUED | OUTPATIENT
Start: 2024-08-15 | End: 2024-08-15 | Stop reason: HOSPADM

## 2024-08-15 RX ORDER — SENNOSIDES 8.6 MG/1
1 TABLET ORAL DAILY PRN
Qty: 14 TABLET | Refills: 0 | Status: SHIPPED | OUTPATIENT
Start: 2024-08-15

## 2024-08-15 RX ORDER — GLUCAGON 1 MG
1 KIT INJECTION
Status: DISCONTINUED | OUTPATIENT
Start: 2024-08-15 | End: 2024-08-15 | Stop reason: HOSPADM

## 2024-08-15 RX ORDER — FAMOTIDINE 20 MG/1
20 TABLET, FILM COATED ORAL
Status: COMPLETED | OUTPATIENT
Start: 2024-08-15 | End: 2024-08-15

## 2024-08-15 RX ORDER — DEXAMETHASONE SODIUM PHOSPHATE 4 MG/ML
INJECTION, SOLUTION INTRA-ARTICULAR; INTRALESIONAL; INTRAMUSCULAR; INTRAVENOUS; SOFT TISSUE
Status: DISCONTINUED | OUTPATIENT
Start: 2024-08-15 | End: 2024-08-15

## 2024-08-15 RX ORDER — GABAPENTIN 300 MG/1
600 CAPSULE ORAL
Status: COMPLETED | OUTPATIENT
Start: 2024-08-15 | End: 2024-08-15

## 2024-08-15 RX ORDER — HYDROMORPHONE HYDROCHLORIDE 1 MG/ML
0.5 INJECTION, SOLUTION INTRAMUSCULAR; INTRAVENOUS; SUBCUTANEOUS EVERY 5 MIN PRN
Status: DISCONTINUED | OUTPATIENT
Start: 2024-08-15 | End: 2024-08-15 | Stop reason: HOSPADM

## 2024-08-15 RX ORDER — DEXMEDETOMIDINE HYDROCHLORIDE 100 UG/ML
INJECTION, SOLUTION INTRAVENOUS
Status: DISCONTINUED | OUTPATIENT
Start: 2024-08-15 | End: 2024-08-15

## 2024-08-15 RX ORDER — ONDANSETRON HYDROCHLORIDE 2 MG/ML
INJECTION, SOLUTION INTRAVENOUS
Status: DISCONTINUED | OUTPATIENT
Start: 2024-08-15 | End: 2024-08-15

## 2024-08-15 RX ORDER — HYDROMORPHONE HYDROCHLORIDE 1 MG/ML
INJECTION, SOLUTION INTRAMUSCULAR; INTRAVENOUS; SUBCUTANEOUS
Status: COMPLETED
Start: 2024-08-15 | End: 2024-08-15

## 2024-08-15 RX ORDER — SCOLOPAMINE TRANSDERMAL SYSTEM 1 MG/1
1 PATCH, EXTENDED RELEASE TRANSDERMAL
Status: DISCONTINUED | OUTPATIENT
Start: 2024-08-15 | End: 2024-08-15 | Stop reason: HOSPADM

## 2024-08-15 RX ORDER — CLINDAMYCIN PHOSPHATE 900 MG/50ML
900 INJECTION, SOLUTION INTRAVENOUS
Status: COMPLETED | OUTPATIENT
Start: 2024-08-15 | End: 2024-08-15

## 2024-08-15 RX ORDER — LIDOCAINE HYDROCHLORIDE 20 MG/ML
INJECTION INTRAVENOUS
Status: DISCONTINUED | OUTPATIENT
Start: 2024-08-15 | End: 2024-08-15

## 2024-08-15 RX ORDER — ONDANSETRON HYDROCHLORIDE 2 MG/ML
4 INJECTION, SOLUTION INTRAVENOUS DAILY PRN
Status: DISCONTINUED | OUTPATIENT
Start: 2024-08-15 | End: 2024-08-15 | Stop reason: HOSPADM

## 2024-08-15 RX ORDER — LABETALOL HYDROCHLORIDE 5 MG/ML
5 INJECTION, SOLUTION INTRAVENOUS ONCE
Status: COMPLETED | OUTPATIENT
Start: 2024-08-15 | End: 2024-08-15

## 2024-08-15 RX ORDER — IBUPROFEN 600 MG/1
600 TABLET ORAL 3 TIMES DAILY
Qty: 30 TABLET | Refills: 0 | Status: SHIPPED | OUTPATIENT
Start: 2024-08-15

## 2024-08-15 RX ORDER — SODIUM CHLORIDE, SODIUM LACTATE, POTASSIUM CHLORIDE, CALCIUM CHLORIDE 600; 310; 30; 20 MG/100ML; MG/100ML; MG/100ML; MG/100ML
INJECTION, SOLUTION INTRAVENOUS CONTINUOUS
Status: DISCONTINUED | OUTPATIENT
Start: 2024-08-15 | End: 2024-08-15 | Stop reason: HOSPADM

## 2024-08-15 RX ORDER — OXYCODONE HYDROCHLORIDE 5 MG/1
5 TABLET ORAL
Status: DISCONTINUED | OUTPATIENT
Start: 2024-08-15 | End: 2024-08-15 | Stop reason: HOSPADM

## 2024-08-15 RX ORDER — MIDAZOLAM HYDROCHLORIDE 2 MG/2ML
2 INJECTION, SOLUTION INTRAMUSCULAR; INTRAVENOUS ONCE AS NEEDED
Status: COMPLETED | OUTPATIENT
Start: 2024-08-15 | End: 2024-08-15

## 2024-08-15 RX ORDER — MORPHINE SULFATE 2 MG/ML
2 INJECTION, SOLUTION INTRAMUSCULAR; INTRAVENOUS EVERY 5 MIN PRN
Status: DISCONTINUED | OUTPATIENT
Start: 2024-08-15 | End: 2024-08-15

## 2024-08-15 RX ORDER — LIDOCAINE HYDROCHLORIDE 10 MG/ML
INJECTION, SOLUTION INFILTRATION; PERINEURAL
Status: DISCONTINUED | OUTPATIENT
Start: 2024-08-15 | End: 2024-08-15 | Stop reason: HOSPADM

## 2024-08-15 RX ORDER — LABETALOL HCL 20 MG/4 ML
SYRINGE (ML) INTRAVENOUS
Status: COMPLETED
Start: 2024-08-15 | End: 2024-08-15

## 2024-08-15 RX ORDER — PROPOFOL 10 MG/ML
VIAL (ML) INTRAVENOUS
Status: DISCONTINUED | OUTPATIENT
Start: 2024-08-15 | End: 2024-08-15

## 2024-08-15 RX ORDER — MUPIROCIN 20 MG/G
OINTMENT TOPICAL
Status: DISCONTINUED | OUTPATIENT
Start: 2024-08-15 | End: 2024-08-15 | Stop reason: HOSPADM

## 2024-08-15 RX ORDER — ROCURONIUM BROMIDE 10 MG/ML
INJECTION, SOLUTION INTRAVENOUS
Status: DISCONTINUED | OUTPATIENT
Start: 2024-08-15 | End: 2024-08-15

## 2024-08-15 RX ORDER — HYDROMORPHONE HYDROCHLORIDE 1 MG/ML
INJECTION, SOLUTION INTRAMUSCULAR; INTRAVENOUS; SUBCUTANEOUS
Status: DISCONTINUED
Start: 2024-08-15 | End: 2024-08-15 | Stop reason: HOSPADM

## 2024-08-15 RX ORDER — HYDROMORPHONE HYDROCHLORIDE 1 MG/ML
INJECTION, SOLUTION INTRAMUSCULAR; INTRAVENOUS; SUBCUTANEOUS
Status: DISCONTINUED | OUTPATIENT
Start: 2024-08-15 | End: 2024-08-15

## 2024-08-15 RX ORDER — OXYCODONE HYDROCHLORIDE 5 MG/1
5 TABLET ORAL EVERY 4 HOURS PRN
Qty: 12 TABLET | Refills: 0 | Status: SHIPPED | OUTPATIENT
Start: 2024-08-15

## 2024-08-15 RX ORDER — FENTANYL CITRATE 50 UG/ML
INJECTION, SOLUTION INTRAMUSCULAR; INTRAVENOUS
Status: DISCONTINUED | OUTPATIENT
Start: 2024-08-15 | End: 2024-08-15

## 2024-08-15 RX ORDER — ACETAMINOPHEN 500 MG
1000 TABLET ORAL
Status: COMPLETED | OUTPATIENT
Start: 2024-08-15 | End: 2024-08-15

## 2024-08-15 RX ORDER — SODIUM CHLORIDE 9 MG/ML
INJECTION, SOLUTION INTRAVENOUS CONTINUOUS
Status: DISCONTINUED | OUTPATIENT
Start: 2024-08-15 | End: 2024-08-15 | Stop reason: HOSPADM

## 2024-08-15 RX ORDER — CELECOXIB 200 MG/1
200 CAPSULE ORAL
Status: COMPLETED | OUTPATIENT
Start: 2024-08-15 | End: 2024-08-15

## 2024-08-15 RX ADMIN — PROPOFOL 200 MG: 10 INJECTION, EMULSION INTRAVENOUS at 11:08

## 2024-08-15 RX ADMIN — SODIUM CHLORIDE, POTASSIUM CHLORIDE, SODIUM LACTATE AND CALCIUM CHLORIDE: 600; 310; 30; 20 INJECTION, SOLUTION INTRAVENOUS at 01:08

## 2024-08-15 RX ADMIN — HYDROMORPHONE HYDROCHLORIDE 0.5 MG: 1 INJECTION, SOLUTION INTRAMUSCULAR; INTRAVENOUS; SUBCUTANEOUS at 03:08

## 2024-08-15 RX ADMIN — LABETALOL HYDROCHLORIDE 5 MG: 5 INJECTION, SOLUTION INTRAVENOUS at 03:08

## 2024-08-15 RX ADMIN — FENTANYL CITRATE 100 MCG: 50 INJECTION INTRAMUSCULAR; INTRAVENOUS at 11:08

## 2024-08-15 RX ADMIN — DEXAMETHASONE SODIUM PHOSPHATE 8 MG: 4 INJECTION, SOLUTION INTRA-ARTICULAR; INTRALESIONAL; INTRAMUSCULAR; INTRAVENOUS; SOFT TISSUE at 11:08

## 2024-08-15 RX ADMIN — GABAPENTIN 600 MG: 300 CAPSULE ORAL at 07:08

## 2024-08-15 RX ADMIN — Medication 25 MG: at 02:08

## 2024-08-15 RX ADMIN — HYDROMORPHONE HYDROCHLORIDE 0.25 MCG: 1 INJECTION, SOLUTION INTRAMUSCULAR; INTRAVENOUS; SUBCUTANEOUS at 01:08

## 2024-08-15 RX ADMIN — Medication 25 MG: at 11:08

## 2024-08-15 RX ADMIN — ROCURONIUM BROMIDE 20 MG: 10 INJECTION INTRAVENOUS at 11:08

## 2024-08-15 RX ADMIN — MORPHINE SULFATE 2 MG: 2 INJECTION, SOLUTION INTRAMUSCULAR; INTRAVENOUS at 03:08

## 2024-08-15 RX ADMIN — DEXMEDETOMIDINE 15 MCG: 200 INJECTION, SOLUTION INTRAVENOUS at 12:08

## 2024-08-15 RX ADMIN — DEXMEDETOMIDINE 10 MCG: 200 INJECTION, SOLUTION INTRAVENOUS at 02:08

## 2024-08-15 RX ADMIN — FAMOTIDINE 20 MG: 20 TABLET, FILM COATED ORAL at 07:08

## 2024-08-15 RX ADMIN — LIDOCAINE HYDROCHLORIDE 100 MG: 20 INJECTION INTRAVENOUS at 11:08

## 2024-08-15 RX ADMIN — SCOPOLAMINE 1 PATCH: 1 PATCH TRANSDERMAL at 07:08

## 2024-08-15 RX ADMIN — ONDANSETRON 4 MG: 2 INJECTION INTRAMUSCULAR; INTRAVENOUS at 02:08

## 2024-08-15 RX ADMIN — ACETAMINOPHEN 1000 MG: 500 TABLET, FILM COATED ORAL at 07:08

## 2024-08-15 RX ADMIN — DEXMEDETOMIDINE 20 MCG: 200 INJECTION, SOLUTION INTRAVENOUS at 12:08

## 2024-08-15 RX ADMIN — ROCURONIUM BROMIDE 20 MG: 10 INJECTION INTRAVENOUS at 01:08

## 2024-08-15 RX ADMIN — MIDAZOLAM HYDROCHLORIDE 2 MG: 1 INJECTION, SOLUTION INTRAMUSCULAR; INTRAVENOUS at 09:08

## 2024-08-15 RX ADMIN — CELECOXIB 200 MG: 200 CAPSULE ORAL at 07:08

## 2024-08-15 RX ADMIN — SODIUM CHLORIDE, POTASSIUM CHLORIDE, SODIUM LACTATE AND CALCIUM CHLORIDE: 600; 310; 30; 20 INJECTION, SOLUTION INTRAVENOUS at 09:08

## 2024-08-15 RX ADMIN — LABETALOL HYDROCHLORIDE 2.5 MG: 5 INJECTION INTRAVENOUS at 12:08

## 2024-08-15 RX ADMIN — OXYCODONE HYDROCHLORIDE 5 MG: 5 TABLET ORAL at 03:08

## 2024-08-15 RX ADMIN — SUGAMMADEX 200 MG: 100 INJECTION, SOLUTION INTRAVENOUS at 02:08

## 2024-08-15 RX ADMIN — GENTAMICIN SULFATE 388 MG: 40 INJECTION, SOLUTION INTRAMUSCULAR; INTRAVENOUS at 11:08

## 2024-08-15 RX ADMIN — ROCURONIUM BROMIDE 50 MG: 10 INJECTION INTRAVENOUS at 11:08

## 2024-08-15 RX ADMIN — CLINDAMYCIN PHOSPHATE 900 MG: 900 INJECTION, SOLUTION INTRAVENOUS at 11:08

## 2024-08-15 NOTE — INTERVAL H&P NOTE
[facility] Preop H&P Update     I have seen and examined the patient on the morning of her surgery  I attest that there are no changes in her medical history since the time the H&P was performed.   Denies fever/chill, nausea/vomiting, abdominal pain, chest pain, SOB.     /73 (BP Location: Left arm, Patient Position: Sitting)   Pulse 62   Temp 98.1 °F (36.7 °C) (Tympanic)   Resp 16   Wt 101.6 kg (224 lb)   LMP 07/31/2024   SpO2 98%   Breastfeeding No   BMI 35.08 kg/m²      A&O33, NAD  RRR  CTABL  Abdomen soft, non tender, non distended  Calf symmetric with no tenderness or cords     She was able to explain the procedure in her own words.  Postoperative care was briefly re-reviewed    She desires to proceed with surgery as planned this morning.    ERAS Protocol   DVT PPX: SCDs  ABX: ancef    TO OR for TLH vs LAVH, BS, and cystoscopy    Marily Blas MD  [facility], PGY-3

## 2024-08-15 NOTE — ANESTHESIA POSTPROCEDURE EVALUATION
Anesthesia Post Evaluation    Patient: Joan Frias    Procedure(s) Performed: Procedure(s) (LRB):  HYSTERECTOMY, TOTAL, LAPAROSCOPIC (N/A)  SALPINGO-OOPHORECTOMY, BILATERAL (Bilateral)  CYSTOSCOPY (N/A)  DESTRUCTION, ENDOMETRIOSIS (N/A)    Final Anesthesia Type: general      Patient location during evaluation: PACU  Post-procedure vital signs: reviewed and stable  Airway patency: patent      Anesthetic complications: no      Cardiovascular status: hemodynamically stable  Respiratory status: spontaneous ventilation  Follow-up not needed.              Vitals Value Taken Time   /106 08/15/24 1450   Temp 36.9 °C (98.5 °F) 08/15/24 1450   Pulse 85 08/15/24 1455   Resp 16 08/15/24 1455   SpO2 96 % 08/15/24 1455         No case tracking events are documented in the log.      Pain/Gregoria Score: Pain Rating Prior to Med Admin: 0 (8/15/2024  7:49 AM)

## 2024-08-15 NOTE — TRANSFER OF CARE
Anesthesia Transfer of Care Note    Patient: Joan Frias    Procedure(s) Performed: Procedure(s) (LRB):  HYSTERECTOMY, TOTAL, LAPAROSCOPIC (N/A)  SALPINGO-OOPHORECTOMY, BILATERAL (Bilateral)  CYSTOSCOPY (N/A)  DESTRUCTION, ENDOMETRIOSIS (N/A)    Patient location: PACU    Anesthesia Type: general    Transport from OR: Transported from OR on room air with adequate spontaneous ventilation    Post pain: adequate analgesia    Post assessment: no apparent anesthetic complications    Post vital signs: stable    Level of consciousness: sedated    Nausea/Vomiting: no nausea/vomiting    Complications: none    Transfer of care protocol was followed      Last vitals: Visit Vitals  BP (!) 150/106 (BP Location: Left arm, Patient Position: Lying)   Pulse 85   Temp 36.9 °C (98.5 °F) (Temporal)   Resp 16   Wt 101.6 kg (224 lb)   LMP 07/31/2024   SpO2 96%   Breastfeeding No   BMI 35.08 kg/m²

## 2024-08-15 NOTE — DISCHARGE INSTRUCTIONS
LAP HYSTERECTOMY WITH TYLENOL/IBUPROFEN WITH OXYCODONE FOR BREAKTHROUGH SEVERE    · Keep follow up appointment in [facility] on the 7th Floor.      ` Resume home medications unless otherwise instructed by your doctor. Pain control:  alternate the prescribed tylenol (acetaminophen) and Ibuprofen every 3-4 hours on a schedule.  Take the Gabapentin on a schedule 3 times daily (about 8 hours apart), --- if still in pain add the oxycodone.    · Pelvic rest for ___6___ weeks (no baths, soaking, tampons, nothing in vagina, no douches or intercourse).    · No heavy lifting/straining for _6_ weeks. Moving around carefully is recommended.    · You may shower _starting tomorrow_. Do not soak incisions. Do not peel or scrub skin glue/ bandage strips (steristrips) or apply ointments/creams/lotions- it will fall off over the next 2 weeks when ready. Sutures inside will dissolve over one month.    · Take pain medication as prescribed. Alternate Tylenol with Ibuprofen and add oxycodone as needed for severe pain, as prescribed only.    · If you are experiencing severe pain even with your pain medications, please call the Womens clinic at [phone] to notify your doctor.    · Take over the counter laxatives such as Miralax for constipation. Do not strain to have a bowel movement.    · Brace belly with pillow when coughing/sneezing/deep breathing.    · Apply ice pack to area as needed for pain.    · No driving or consuming alcohol for the next 24 hours or while taking narcotic pain medicine.    · Some bleeding/spotting is to be expected for several days.    · Notify MD of bleeding more than 1 pad per hour, any moderate to severe pain unrelieved by pain medicine or for any signs of infection including fever above 100.4, yellow/green foul-smelling drainage, nausea or vomiting. Clinics number: [phone], or after business hours or emergency call [phone] (answering service).    · Thanks for choosing [facility]! Have a smooth  recovery!

## 2024-08-15 NOTE — ANESTHESIA PROCEDURE NOTES
Intubation    Date/Time: 8/15/2024 11:16 AM    Performed by: Ryan Blas CRNA  Authorized by: Asia Zhao MD    Intubation:     Induction:  Intravenous    Intubated:  Postinduction    Mask Ventilation:  Easy mask    Attempts:  1    Attempted By:  Student (AGUILAR BHATT)    Method of Intubation:  Direct    Blade:  Santana 2    Laryngeal View Grade: Grade I - full view of cords      Difficult Airway Encountered?: No      Complications:  None    Airway Device:  Oral endotracheal tube    Airway Device Size:  7.5    Style/Cuff Inflation:  Cuffed (inflated to minimal occlusive pressure)    Tube secured:  23    Secured at:  The lips    Placement Verified By:  Capnometry    Complicating Factors:  None    Findings Post-Intubation:  BS equal bilateral and atraumatic/condition of teeth unchanged

## 2024-08-15 NOTE — OP NOTE
[facility]  Surgery Department  Operative Note    SUMMARY     Date of Procedure: 8/15/2024     Procedure:   Total laparoscopic hysterectomy, bilateral salpingectomy, cystoscopy    Surgeons and Role:     * Diana Méndez MD - Primary     * Darya Briggs MD - Resident     * Marily Blas MD - Resident    Pre-Operative Diagnosis:   Abnormal uterine bleeding  Dysmenorrhea  Suspected endometriosis    Post-Operative Diagnosis:   Abnormal uterine bleeding  Dysmenorrhea    Anesthesia: General    Operative Findings (including complications, if any): Normal external female genitalia. Vaginal mucosa normal in appearance with no lesions or masses. Cervical os parous, no lesions or masses. Uterus approximately 10 cm in size, smooth.     On laparoscopy, no evidence of injury to underlying structures on survey. Normal liver edge, gallbladder, and stomach edge. Ovaries and fallopian tubes normal in appearance. Uterus sounded to 8cm, regular contour. Ureters visualized bilaterally.      On Cystoscopy, efflux of bilateral ureters noted. Bladder survey without evidence of injury.    Procedure:  The patient was pre-medicated per ERAS protocol with Gabapentin, Acetaminophen, and Celebrex. A Scopolamine patch was in place. The patient was taken to the OR with IV fluids running. Gentamicin/Clindamycin were administered for infection prophylaxis. SCDs were placed to bilateral lower extremities for DVT prophylaxis. She was placed in dorsal supine position. General endotracheal anesthesia was then administered without incident, and an orogastric tube was placed in the usual fashion. The patient was positioned in dorsal lithotomy position using Everton stirrups with careful attention to leg positioning. Both arms were tucked in  position with liberal padding of the elbows and hands. A Bovie pad was placed on the right side. A timeout was performed.  A pelvic exam was performed and noted the above  findings. The patient was then prepped and draped in the usual sterile fashion.     Attention was paid to the vagina, where a Baron catheter was inserted into the bladder. A bivalve speculum was placed in the vagina, and the cervix was identified and grasped with a single-toothed tenaculum. The uterus was sounded to 8 cm. The PATRICA Arch manipulator was assembled and inserted into the uterus. The intrauterine balloon was inflated with 5cc of saline. The cervical cup was placed around the cervix.       Attention was then paid to the laparoscopic portion of the procedure. 2 mL of 0.25% Marcaine was injected at the left-mid umbilical position, and a 5-mm incision was then made with a scalpel 8cm lateral to the umbilicus on the left side of the patient. With direct visualization, a 5-mm trocar was introduced into the abdomen. Upon confirmation of entry into the abdomen, low-flow CO2 gas was initiated with confirmation of low pressure, and thus, high-flow gas was initiated to a level of 15 mm Hg. No visceral or vascular injury occurred with entry into the abdomen. At that time, Trendelenberg position was obtained to keep the bowel out of the operative field.    A survey of the upper abdomen was performed with the above-noted findings, and the patient was then placed in steep Trendelenburg, revealing the above-noted findings. Four additional 5mm trocars were then placed under direct visualization, one in umbilicus, one in the LLQ, and two in the RLQ. The fimbriated end of the left fallopian tube was grasped, ligated, and transected using the Enseal device. The mesosalpinx of the left fallopian tube was grasped and sequentially ligated and transected until reaching the left uterine cornua. At the cornua, the fallopian tube was transected, and the tube was removed and sent for pathology. The left uteroovarian ligament was grasped, ligated, and transected. The left round ligament was then grasped, ligated, and transected in a  similar manner. The upper edge of the KOH ring was subsequently palpated laparoscopically. Attention was then turned to the anterior leaf of the left broad ligament and development of the bladder flap. The bladder was dissected off of the uterus using sharp and blunt dissection. The atraumatic graspers were used on the bladder to provide counter traction.      Attention was then paid to the right side. The fimbriated end of the right fallopian tube was grasped, ligated, and transected using the Enseal device. The mesosalpinx of the right fallopian tube was grasped and sequentially ligated and transected until reaching the right uterine cornua. The fallopian tube was then transected and placed into the posterior cul de sac. The bladder flap was further developed along the right broad ligament until the bladder was eventually dropped down and out of the proximity of the uterine blood supply and off of the planned colpotomy site.    Attention was then paid to the uterine arteries. The left uterine artery was skeletonized, ligated, and transected at the internal os, then lateralized past the Teddy ring. The right uterine artery was skeletonized in a similar fashion, ligated, and transected at the internal os. A blood vessel was noted to be briskly bleeding lateral to the transection of the right uterine artery at the level of the internal os. The blood vessel was grasped and collapsed, and the path of the right ureter was identified and followed along the left lateral sidewall, noted to implant into the bladder trigone inferior to the blood vessel. The blood vessel was then dessicated with the Enseal device, and hemostasis was achieved.      Subsequently, the cervicovaginal junction was clearly identified, and the colpotomy was performed along the upper edge of the Koh ring. The PATRICA manipulator and specimen, including the right fallopian tube, were removed through the vagina without difficulty.  The cuff was closed  laparoscopically in a horizontal fashion in 2 layers using 2-0 V-Loc in a running method. The pelvis was copiously irrigated and suctioned with excellent hemostasis noted along all of the pedicles bilaterally. Surgicel powder was applied for additional hemostasis.    Attention was then paid to the cystoscopy, where the 70-degree cystoscope was introduced into the bladder without difficulty. The bladder was inspected and noted to be intact without evidence of trauma. There was noted to be efflux of urine from bilateral ureteral orifices. The cystoscope was then removed.     All specimens were collected, labeled, and sent to pathology for analysis. The remaining trocars were deflated and removed under direct visualization after 5 manual forced breaths. The skin incisions were injected with 10mL of 0.25% Marcaine and closed using 4-0 Monocryl, after which they were covered with Dermabond. A vaginal sweep was performed and revealed no retained instruments.     All instrument and sponge counts were correct times two. The patient tolerated the procedure well. She was extubated and transferred to recovery in stable condition.      Dr. Méndez was present and scrubbed for the entirety of the procedure.    Estimated Blood Loss (EBL): 30 mL    IV Fluids: 1000 mL    Urine Output: 290 mL            Implants: * No implants in log *    Specimens:   Specimen (24h ago, onward)       Start     Ordered    08/15/24 1427  Specimen to Pathology  RELEASE UPON ORDERING        References:    Click here for ordering Quick Tip   Question:  Release to patient  Answer:  Immediate    08/15/24 1427                   Condition: Good    Disposition: PACU - hemodynamically stable.    Darya Briggs MD  [facility], PGY-4

## 2024-08-15 NOTE — DISCHARGE SUMMARY
[facility]  Discharge Note  Short Stay    Procedure(s) (LRB):  Total laparoscopic hysterectomy, bilateral salpingectomy, and cystoscopy    OUTCOME: Patient tolerated treatment/procedure well without complication and is now ready for discharge.    DISPOSITION: Home or Self Care    FINAL DIAGNOSIS:  Abnormal uterine bleeding    FOLLOWUP: In clinic on 08/28/24    DISCHARGE INSTRUCTIONS:    Discharge Procedure Orders   Diet Adult Regular     Lifting restrictions   Order Comments: No heavy lifting greater than 8-10lb for 6 weeks.     Pelvic Rest   Order Comments: Nothing in the vagina for 6 weeks.     No driving until:   Order Comments: No longer taking narcotic medications     Notify your health care provider if you experience any of the following:  temperature >100.4     Notify your health care provider if you experience any of the following:  persistent nausea and vomiting or diarrhea     Notify your health care provider if you experience any of the following:  severe uncontrolled pain     Notify your health care provider if you experience any of the following:  redness, tenderness, or signs of infection (pain, swelling, redness, odor or green/yellow discharge around incision site)     Notify your health care provider if you experience any of the following:  persistent dizziness, light-headedness, or visual disturbances     Activity as tolerated      Darya Briggs MD  [facility], PGY-4

## 2024-08-16 VITALS
HEART RATE: 86 BPM | SYSTOLIC BLOOD PRESSURE: 155 MMHG | BODY MASS INDEX: 35.08 KG/M2 | DIASTOLIC BLOOD PRESSURE: 91 MMHG | WEIGHT: 224 LBS | OXYGEN SATURATION: 94 % | RESPIRATION RATE: 18 BRPM | TEMPERATURE: 99 F

## 2024-08-22 ENCOUNTER — TELEPHONE (OUTPATIENT)
Dept: GYNECOLOGY | Facility: CLINIC | Age: 44
End: 2024-08-22
Payer: MEDICAID

## 2024-08-22 DIAGNOSIS — R11.0 NAUSEA: Primary | ICD-10-CM

## 2024-08-22 RX ORDER — ONDANSETRON 4 MG/1
4 TABLET, FILM COATED ORAL EVERY 6 HOURS PRN
Qty: 24 TABLET | Refills: 0 | Status: SHIPPED | OUTPATIENT
Start: 2024-08-22

## 2024-08-22 NOTE — TELEPHONE ENCOUNTER
Pt had a TLH on 8/15/2024. Pt is now having diarrhea, nausea, headache and upset stomach. Pt voiced she thinks she has a stomach bug but wants to make sure if she needs to be seen. Pt also would like some med to help with the diarrhea. Pt voiced she is only taking tylenol because the Oxycodone upset her stomach after surgery. Her symptoms started yesterday. Pt was advised to try and stay hydrated. Please advise, thank you.

## 2024-08-23 NOTE — TELEPHONE ENCOUNTER
MD contacted patient after reciving message patient had called into clinic. She is 7 days s/p TLH, BS, and cystoscopy. Patient reports nausea and diarrhea for several days. She denies any emesis, but reports she is not eating much. She is staying hydrated with water and is tolerating popsickles. She reports she took tylenol for pain for 2 days postop, but then no longer required any pain medicine. She denies fever, and she reprots her incision sites look clean and dry. She reports her nephew is experiencing similar symptoms, and she is suspicious they have the same GI virus. Recommend supportive care. Reviewed ED precautions. Rx for zofran sent. Recommend eating small, bland meals throughout the day. Will reassess symptoms if still present at postop appointment, scheduled in 1 week.     Marily Blas MD  [facility], PGY-3

## 2024-08-28 ENCOUNTER — CLINICAL SUPPORT (OUTPATIENT)
Dept: GYNECOLOGY | Facility: CLINIC | Age: 44
End: 2024-08-28
Payer: MEDICAID

## 2024-08-28 DIAGNOSIS — R19.7 DIARRHEA, UNSPECIFIED TYPE: Primary | ICD-10-CM

## 2024-08-28 DIAGNOSIS — Z90.710 STATUS POST LAPAROSCOPIC HYSTERECTOMY: ICD-10-CM

## 2024-08-28 DIAGNOSIS — K58.9 IRRITABLE BOWEL SYNDROME, UNSPECIFIED TYPE: ICD-10-CM

## 2024-08-28 NOTE — PROGRESS NOTES
[facility] Progress Note    Primary Site: [facility]  Patient location: Home  Physician location: In office      Chief Complaint: post-op follow-up, s/p TLH, persistent    HPI  Joan Frias joined me via our telemedicine platform.She is still having persistent nausea and diarrhea with all foods. Unable to even tolerate crackers. Does have a h/o IBS but was only taking probiotics for this. She states that with IBS she can alternate between diarrhea/constipation. She feels that her pain is well controlled, only occurs after eating, and is followed by diarrhea. She is passing some flatus. She is not feeling lightheaded/dizzy. Denies vaginal bleeding, fevers, chills, abnormal discharge. Her incisions are healing well.       I have reviewed family history, social history, medications and allergies as documented in the patient's electronic medical record.      Reports, labs, outside records, and images have been reviewed with pertinent interpretations below. See telemedicine notes records for intervening communications.        Assessment/Plan  Problem List Items Addressed This Visit          GI    Diarrhea - Primary     Other Visit Diagnoses       Status post laparoscopic hysterectomy        Irritable bowel syndrome, unspecified type              Post-operative diarrhea   Likely 2/2 IBS flare after stress from surgery   Will rx Lomotil for symptomatic relief  Plan to RTC at scheduled appointment on 09/27/24   Strict ED return precautions provided       See correspondence above for plan.   Patient's needs assessed and health education provided. Patient understands risks, benefits, and alternatives of treatment prescribed above. Patient verbalizes understanding and agrees to follow plan.    Patient's identity was confirmed and confidentiality/privacy confirmed prior to visit. I certify that this visit was done via secure two-way transmission with informed consent of the patient and/or guardian.  Each patient to whom I  provide medical services by telemedicine is:  (1) informed of the relationship between the physician and patient and the respective role of any other health care provider with respect to management of the patient; and (2) notified that they may decline to receive medical services by telemedicine and may withdraw from such care at any time. Patient verbally consented to receive this service via voice-only telephone call.    Audio only was selected because there was no capability for video conferencing with patient.      12min of the time was counseling or coordinating care.  Start Time: 1535  End Time: 1547      Discussed with Dr. Provost Nereida Varma MD   PGY2, Obstetrics & Gynecology   [facility]

## 2024-08-29 ENCOUNTER — PATIENT MESSAGE (OUTPATIENT)
Dept: GYNECOLOGY | Facility: CLINIC | Age: 44
End: 2024-08-29
Payer: MEDICAID

## 2024-09-27 ENCOUNTER — OFFICE VISIT (OUTPATIENT)
Dept: GYNECOLOGY | Facility: CLINIC | Age: 44
End: 2024-09-27
Payer: MEDICAID

## 2024-09-27 VITALS
HEIGHT: 68 IN | WEIGHT: 216 LBS | SYSTOLIC BLOOD PRESSURE: 111 MMHG | DIASTOLIC BLOOD PRESSURE: 79 MMHG | HEART RATE: 93 BPM | OXYGEN SATURATION: 100 % | BODY MASS INDEX: 32.74 KG/M2 | RESPIRATION RATE: 20 BRPM | TEMPERATURE: 98 F

## 2024-09-27 DIAGNOSIS — N80.9 ENDOMETRIOSIS: ICD-10-CM

## 2024-09-27 DIAGNOSIS — Z09 POSTOP CHECK: Primary | ICD-10-CM

## 2024-09-27 PROCEDURE — 99214 OFFICE O/P EST MOD 30 MIN: CPT | Mod: PBBFAC

## 2024-09-27 NOTE — PROGRESS NOTES
"[facility] NOTE  [facility]  [address] [place] [address]  Phone: [phone]  Fax: [phone]    Postoperative Follow-Up    Subjective:      Joan Frias is a 43 y.o.  s/p TLH, bilateral BS and cystoscopy  2/2 AUB/dysmenorrhea, suspected endometriosis on 8/15/2024 who presents to the clinic 6 weeks post-op.    Patient doing well. Her postop course was initially complicated by nausea and vomiting that she attributed to her IBS. That has now resolved. She is tolerating a regular diet now w/o issue. Bowel habits have returned to her baseline. Denies pain. No vaginal bleeding.    Patient's medications, allergies, past medical, surgical, social and family histories were reviewed and updated as appropriate.    Review of Systems  Denies fevers, chills, headache, blurry vision, nausea, vomiting, dizziness, or syncope.   Denies chest pain, shortness of breath, RUQ pain, or calf pain.     Objective:     Vitals:    24 0745   BP: 111/79   BP Location: Right arm   Patient Position: Sitting   BP Method: Large (Automatic)   Pulse: 93   Resp: 20   Temp: 98.3 °F (36.8 °C)   SpO2: 100%   Weight: 98 kg (216 lb)   Height: 5' 8" (1.727 m)       Physical Exam:     General: alert and oriented, in no acute distress  Lungs: clear to auscultation bilaterally, no conversational dyspnea  Heart: regular rate and rhythm  Abdomen: Soft, non-distended, non tender to palpation  Incision Sites: laparoscopic incisions clean/dry/intact  Extremities: Normal, atraumatic, non-edematous, No cords or calf tenderness, No significant calf/ankle edema  External genitalia: Normal female genitalia without lesion, discharge or tenderness. Normal appearing urethral meatus. Normal appearing external anus.  Bimanual Exam: Vaginal cuff digitally intact, nontender  Speculum Exam: Vaginal mucosa normal in appearance. Pink. No masses/lesions. Cuff visibly in tact without defect.   Note: RN chaperone present for entirety of " exam.    Pathology:  Uterus, cervix, right and left fallopian tubes, hysterectomy:  -Endometrium:  Weakly proliferative  -Myometrium and cervix: No significant changes  -Parametrium: Focal area suspicious for endometriosis.  -Right and left fallopian tubes: No significant changes.        Assessment:     Joan Frias is a 43 y.o.  s/p TLH, bilateral BS and cystoscopy  2/2 AUB/dysmenorrhea, suspected endometriosis on 8/15/2024 Doing well post-operatively.  Operative findings again reviewed. Pathology report discussed.     Plan:     Endometriosis  - pathology notable for endometriosis. Discussed starting ovarian suppression in setting of retained ovaries. Patient desires to see how her pain is at baseline first before starting any medications. No pain currently.     Postop  Continue any current medications.  Wound care discussed.  Activity restrictions: none   Anticipated return to work: Now  Follow up: in 1 year for annual     Patient and plan were discussed with Dr. Singh.    Sylvia Díaz, MS3    Geoffrey Madsen MD  [facility]  PGY-4

## 2024-10-03 ENCOUNTER — OFFICE VISIT (OUTPATIENT)
Dept: INTERNAL MEDICINE | Facility: CLINIC | Age: 44
End: 2024-10-03
Payer: MEDICAID

## 2024-10-03 VITALS
RESPIRATION RATE: 19 BRPM | DIASTOLIC BLOOD PRESSURE: 85 MMHG | WEIGHT: 223.31 LBS | BODY MASS INDEX: 33.84 KG/M2 | OXYGEN SATURATION: 100 % | HEART RATE: 86 BPM | HEIGHT: 68 IN | SYSTOLIC BLOOD PRESSURE: 124 MMHG | TEMPERATURE: 98 F

## 2024-10-03 DIAGNOSIS — Z72.0 TOBACCO USER: Primary | ICD-10-CM

## 2024-10-03 DIAGNOSIS — Z12.39 ENCOUNTER FOR SCREENING FOR MALIGNANT NEOPLASM OF BREAST, UNSPECIFIED SCREENING MODALITY: ICD-10-CM

## 2024-10-03 DIAGNOSIS — R19.7 DIARRHEA, UNSPECIFIED TYPE: ICD-10-CM

## 2024-10-03 DIAGNOSIS — M79.7 FIBROMYALGIA: Chronic | ICD-10-CM

## 2024-10-03 DIAGNOSIS — Z00.00 ANNUAL WELLNESS VISIT: ICD-10-CM

## 2024-10-03 DIAGNOSIS — G89.29 CHRONIC LOW BACK PAIN, UNSPECIFIED BACK PAIN LATERALITY, UNSPECIFIED WHETHER SCIATICA PRESENT: ICD-10-CM

## 2024-10-03 DIAGNOSIS — N39.41 URGE INCONTINENCE: ICD-10-CM

## 2024-10-03 DIAGNOSIS — N39.46 URINARY INCONTINENCE, MIXED: ICD-10-CM

## 2024-10-03 DIAGNOSIS — K92.1 HEMATOCHEZIA: ICD-10-CM

## 2024-10-03 DIAGNOSIS — K21.9 GASTROESOPHAGEAL REFLUX DISEASE, UNSPECIFIED WHETHER ESOPHAGITIS PRESENT: ICD-10-CM

## 2024-10-03 DIAGNOSIS — M54.50 CHRONIC LOW BACK PAIN, UNSPECIFIED BACK PAIN LATERALITY, UNSPECIFIED WHETHER SCIATICA PRESENT: ICD-10-CM

## 2024-10-03 DIAGNOSIS — R10.9 RIGHT SIDED ABDOMINAL PAIN: ICD-10-CM

## 2024-10-03 PROCEDURE — 99215 OFFICE O/P EST HI 40 MIN: CPT | Mod: PBBFAC

## 2024-10-03 RX ORDER — HYOSCYAMINE SULFATE 0.125 MG
125 TABLET ORAL EVERY 6 HOURS PRN
Qty: 90 TABLET | Refills: 0 | Status: SHIPPED | OUTPATIENT
Start: 2024-10-03

## 2024-10-03 RX ORDER — PANTOPRAZOLE SODIUM 40 MG/1
40 TABLET, DELAYED RELEASE ORAL EVERY MORNING
Qty: 90 TABLET | Refills: 0 | Status: SHIPPED | OUTPATIENT
Start: 2024-10-03

## 2024-10-03 RX ORDER — MIRABEGRON 50 MG/1
50 TABLET, EXTENDED RELEASE ORAL DAILY
Qty: 90 TABLET | Refills: 1 | Status: SHIPPED | OUTPATIENT
Start: 2024-10-03 | End: 2025-04-01

## 2024-10-03 NOTE — PROGRESS NOTES
"[facility] Internal Medicine Clinic Visit    Chief Complaint:      Follow-up (Pt here today for f/u visit. Has question regarding applying for disability. )     Subjective:     HPI:  Joan Frias is a 43 y.o. female with a endometriosis, AUB, fibromyalgia, class II obesity, chronic lower back pain, longtime smoker 1 PPD w/25 pack year history, daily marijuana use here for clinic f/u.     Patient reports that she has had recent surgery of hysterectomy 7 weeks ago for endometriosis.  Her symptoms of bleeding and fatigue has been controlled since.  She also says that she has been having the chronic hip and back pain, went to physical therapy previously could not continue with them due to bleeding, but requests for a new referral to go back there.   Had recent colonoscopy for hematochezia which had to polyps, 1 adenomatous 1 hyperplastic which were removed, and internal hemorrhoids noted.  Was diagnosed with IBS, Still reports to occasional symptoms of irritable bowel syndrome, now controlled with diet modifications. She also complaints of reflux symptoms, controlled with the pantoprazole and with diet management.   She says that she has history of fibromyalgia reported since more than 15 years and got shots before but did not follow any doctor because of no insurance.  She was started on Savella in the last visit and that has been helping her pain.  She also complained of mouth dryness and cheilosis, was prescribed magic mouthwash but reported that it was not helping.Denies shortness or breath, nausea, lightheadedness, syncope.  No longer having significant hematochezia.      Review of Systems  12 point ROS negative unless mentioned above    Objective:   Last 24 Hour Vital Signs:  Vitals  BP: 124/85  Temp: 98.4 °F (36.9 °C)  Temp Source: Oral  Pulse: 86  Resp: 19  SpO2: 100 %  Height: 5' 8" (172.7 cm)  Weight: 101.3 kg (223 lb 5.2 oz)    Physical Examination:    General: Awake, alert, & oriented to person, place & time. No " "acute distress  Psychiatric: Mood and affect normal  HEENT: Normocephalic, atraumatic. Face symmetric.  Cardiovascular: Regular rate & rhythm, Normal S1 & S2 w/out murmurs, rubs or gallops, No JVD appreciated  Pulmonary: Bilateral symmetric chest rise, Non-labored, no wheezes, rhonchi or crackles are appreciated  Abdominal:  Soft, nontender, and nondistended  Extremities: No clubbing, cyanosis or edema.  Skin:  Exposed skin is warm & dry.  Neuro:   Patient moves all extremities equally. Sensation intact bilateraly.     Labs  BMP:   Lab Results   Component Value Date    CO2 26 05/14/2024    BUN 5.5 (L) 05/14/2024    CREATININE 0.74 05/14/2024    GLUCOSE 94 05/14/2024    CALCIUM 9.7 05/14/2024     CBC:   Lab Results   Component Value Date    WBC 10.44 08/15/2024    HGB 13.2 08/15/2024    HCT 39.6 08/15/2024    MCV 88.4 08/15/2024    RDW 14.1 08/15/2024     LFTs:   Lab Results   Component Value Date    LABPROT 7.6 05/14/2024    ALBUMIN 4.3 05/14/2024    AST 22 05/14/2024    ALT 30 05/14/2024    ALKPHOS 72 05/14/2024     FLP:   Lab Results   Component Value Date    CHOL 213 (H) 11/02/2023    HDL 46 11/02/2023    .00 (H) 11/02/2023    TRIG 91 11/02/2023    TOTALCHOLEST 5 11/02/2023     DM:   Lab Results   Component Value Date    HGBA1C 5.1 11/02/2023    EAG 99.7 11/02/2023    CREATININE 0.74 05/14/2024     Thyroid:   Lab Results   Component Value Date    TSH 1.089 03/13/2024     Anemia: No results found for: "IRON", "TIBC", "FERRITIN", "OMVHIKIE38", "FOLATE"          Assessment & Plan:     Stress and Urge incontinence  - patient was only using Ditropan XL daily  -recommended to start back on mirabegron.  - Continue f/u with [facility] urology in January, 2025    GERD  - symptoms controlled on diet management and Protonix  -Continue Protonix 40 mg daily for now    Irritable bowel syndrome  - Seen by GI, colonoscopy done showed 2 polyps, 1 adenomatous 1 hyperplastic which were removed, and internal hemorrhoids noted.  - " Will add TTG and Vit D; concern for malabsorptive disease   - pt states she's eating less bread/starch with some improvement of symptoms  - continue hyoscyamine for abdominal spasms.    Fibromyalgia  -Pt has reported hx of fibromyalgia  -Referring to [facility] for PT for back/neck/shoulder  -started on milnacipran dose pack up to 50 mg BID dosing in the last visit.  -reports to have symptomatic relief.    Endometriosis  -Pt has heavy, long periods with some bleeding in between as cycles  -had hysterectomy in September 2024    Smoker  -Pt w/ 25 pack year hx, smoking 1/2 PPD  -Stopped smoking since a week. Attempt to continue sessation    Health Maintenance  -Vaccines:    -Pneumonia: refused    -Tdap: refused    -Flu: refused    -Covid: refused  -10 year ASCVD risk: 3.9% on 5/14/24, no indication for statin therapy.  Lipid panel ordered today.  -Colon Cancer Screen: c-scope 4/3/24- needs repeat in 7 years per GI recs  -Breast Cancer Screen: discussed with pt, prefer to start MMG at age 47  -Cervical Cancer Screen: Pap negative 3/13/24, repeat per GYN recs  -Bone Density Screen: n/a  -HCV lifetime screen: ordered  -HIV lifetime screen: ordered      To be addressed at next follow-up  -lipid panel, HbA1c, xray    Follow-ups  -Follow-up medicine clinic in 3 m      Shawna Forrest MD  Internal Medicine - PGY-1

## 2025-01-08 ENCOUNTER — HOSPITAL ENCOUNTER (OUTPATIENT)
Dept: RADIOLOGY | Facility: HOSPITAL | Age: 45
Discharge: HOME | End: 2025-01-08
Payer: MEDICAID

## 2025-01-08 ENCOUNTER — OFFICE VISIT (OUTPATIENT)
Dept: UROLOGY | Facility: CLINIC | Age: 45
End: 2025-01-08
Payer: MEDICAID

## 2025-01-08 VITALS
RESPIRATION RATE: 20 BRPM | HEART RATE: 73 BPM | TEMPERATURE: 98 F | HEIGHT: 68 IN | OXYGEN SATURATION: 98 % | WEIGHT: 237 LBS | DIASTOLIC BLOOD PRESSURE: 83 MMHG | BODY MASS INDEX: 35.92 KG/M2 | SYSTOLIC BLOOD PRESSURE: 119 MMHG

## 2025-01-08 DIAGNOSIS — R92.8 ABNORMAL MAMMOGRAM: ICD-10-CM

## 2025-01-08 DIAGNOSIS — N39.41 URGE INCONTINENCE: ICD-10-CM

## 2025-01-08 DIAGNOSIS — R31.21 ASYMPTOMATIC MICROSCOPIC HEMATURIA: Primary | ICD-10-CM

## 2025-01-08 DIAGNOSIS — Z87.442 HISTORY OF KIDNEY STONES: ICD-10-CM

## 2025-01-08 DIAGNOSIS — N39.3 STRESS INCONTINENCE: ICD-10-CM

## 2025-01-08 PROCEDURE — 77061 BREAST TOMOSYNTHESIS UNI: CPT | Mod: TC,RT

## 2025-01-08 PROCEDURE — 3008F BODY MASS INDEX DOCD: CPT | Mod: CPTII,,, | Performed by: UROLOGY

## 2025-01-08 PROCEDURE — 3079F DIAST BP 80-89 MM HG: CPT | Mod: CPTII,,, | Performed by: UROLOGY

## 2025-01-08 PROCEDURE — 99213 OFFICE O/P EST LOW 20 MIN: CPT | Mod: S$PBB,,, | Performed by: UROLOGY

## 2025-01-08 PROCEDURE — 99214 OFFICE O/P EST MOD 30 MIN: CPT | Mod: PBBFAC,25 | Performed by: UROLOGY

## 2025-01-08 PROCEDURE — 76642 ULTRASOUND BREAST LIMITED: CPT | Mod: 26,50,, | Performed by: STUDENT IN AN ORGANIZED HEALTH CARE EDUCATION/TRAINING PROGRAM

## 2025-01-08 PROCEDURE — 3074F SYST BP LT 130 MM HG: CPT | Mod: CPTII,,, | Performed by: UROLOGY

## 2025-01-08 PROCEDURE — 76642 ULTRASOUND BREAST LIMITED: CPT | Mod: TC,50

## 2025-01-08 PROCEDURE — 1159F MED LIST DOCD IN RCRD: CPT | Mod: CPTII,,, | Performed by: UROLOGY

## 2025-01-08 PROCEDURE — 77065 DX MAMMO INCL CAD UNI: CPT | Mod: 26,RT,, | Performed by: STUDENT IN AN ORGANIZED HEALTH CARE EDUCATION/TRAINING PROGRAM

## 2025-01-08 PROCEDURE — 77061 BREAST TOMOSYNTHESIS UNI: CPT | Mod: 26,RT,, | Performed by: STUDENT IN AN ORGANIZED HEALTH CARE EDUCATION/TRAINING PROGRAM

## 2025-01-08 PROCEDURE — 81001 URINALYSIS AUTO W/SCOPE: CPT | Mod: PBBFAC | Performed by: UROLOGY

## 2025-01-08 NOTE — PROGRESS NOTES
CC:  6 month f/u microscopic hematuria    HPI:  Joan Frias is a 44 y.o. female here for 6 month follow up.  She has a history of microscopic hematuria. Cystoscopy in October 2023 was negative and CT scan in September 2023 was negative.  She has smoked 1 1/2 to 2 packs per day for 20 years.  She did have one episode of kidney stones when she was in her 20s which she passed on her own.  The CT scan in September did not show any stones.  She has no flank or abdominal pain at this time.   She previously had c/o stress urinary incontinence with sneeze, laugh, cough or even walking fast. Does not have the same complaints today.   She also has intermittent episodes of urge incontinence.  She has been on Myrbetriq and Oxybutynin 10 mg which helps. She is having no urinary issues today.    She had a hysterectomy in August 2024 which resolved all of her urinary issues.     Urinalysis:  Results for orders placed or performed in visit on 01/08/25   POCT URINE DIPSTICK WITH MICROSCOPE, AUTOMATED   Result Value Ref Range    Color, UA Yellow     Spec Grav UA 1.020     pH, UA 7.5     WBC, UA neg     Nitrite, UA neg     Protein, POC neg     Glucose, UA neg     Ketones, UA neg     Urobilinogen, UA 0.2     Bilirubin, POC neg     Blood, UA neg      ROS:  All systems reviewed and are negative except as documented in HPI and/or Assessment and Plan.     Patient Active Problem List:     Patient Active Problem List   Diagnosis    Hematochezia    Diarrhea    Abdominal pain    Tobacco user    Fibromyalgia    Abnormal uterine bleeding (AUB)    Endometriosis    Urinary incontinence, mixed    Cervical cancer screening    Adenomyosis    Postop check        Past Medical History:  Past Medical History:   Diagnosis Date    Abnormal uterine bleeding     Degenerative lumbar disc     Diarrhea 09/20/2023    Endometriosis, unspecified     Fibromyalgia     Mixed incontinence         Past Surgical History:  Past Surgical History:   Procedure Laterality  Date    ABSCESS DRAINAGE  2019    BILATERAL SALPINGO-OOPHORECTOMY (BSO) Bilateral 08/15/2024    Procedure: SALPINGO-OOPHORECTOMY, BILATERAL;  Surgeon: Diana Méndez MD;  Location: [facility] OR;  Service: OB/GYN;  Laterality: Bilateral;    COLONOSCOPY N/A 04/03/2024    Procedure: COLONOSCOPY;  Surgeon: Tuyet Guillen MD;  Location: [facility] ENDOSCOPY;  Service: Gastroenterology;  Laterality: N/A;    COLONOSCOPY, WITH POLYPECTOMY USING SNARE  04/03/2024    Procedure: COLONOSCOPY, WITH POLYPECTOMY USING SNARE;  Surgeon: Tuyet Guillen MD;  Location: [facility] ENDOSCOPY;  Service: Gastroenterology;;    CYST REMOVAL      CYSTOSCOPY N/A 08/15/2024    Procedure: CYSTOSCOPY;  Surgeon: Diana Méndez MD;  Location: [facility] OR;  Service: OB/GYN;  Laterality: N/A;    HYSTERECTOMY      LAPAROSCOPIC TOTAL HYSTERECTOMY N/A 08/15/2024    Procedure: HYSTERECTOMY, TOTAL, LAPAROSCOPIC;  Surgeon: Diana Méndez MD;  Location: [facility] OR;  Service: OB/GYN;  Laterality: N/A;    SURGICAL REMOVAL OF ENDOMETRIOSIS N/A 08/15/2024    Procedure: DESTRUCTION, ENDOMETRIOSIS;  Surgeon: Diana Méndez MD;  Location: [facility] OR;  Service: OB/GYN;  Laterality: N/A;    TUBAL LIGATION          Family History:  Family History   Problem Relation Name Age of Onset    Hypertension Mother Rhina linda     Hyperlipidemia Mother Rhina linda     Stroke Mother Rhinabossman linda     Uterine cancer Sister      Ulcerative colitis Paternal Uncle Franco, Juan     Heart attack Maternal Grandfather      Cancer Maternal Grandfather      Cystic fibrosis Paternal Grandmother Fauzia linda     COPD Paternal Grandmother Fauzia linda     Heart attack Paternal Grandfather      Diabetes Paternal Grandfather          Social History:  Social History     Socioeconomic History    Marital status: Legally    Tobacco Use    Smoking status: Former     Current packs/day: 1.50     Average packs/day: 1.5 packs/day for 26.9 years (40.4 ttl pk-yrs)     Types: Cigarettes     Start date:  2/1/1998    Smokeless tobacco: Never   Substance and Sexual Activity    Alcohol use: Not Currently    Drug use: Yes     Frequency: 7.0 times per week     Types: Marijuana     Comment: daily    Sexual activity: Yes     Partners: Male     Birth control/protection: None     Social Drivers of Health     Financial Resource Strain: Patient Declined (10/2/2024)    Overall Financial Resource Strain (CARDIA)     Difficulty of Paying Living Expenses: Patient declined   Food Insecurity: Patient Declined (10/2/2024)    Hunger Vital Sign     Worried About Running Out of Food in the Last Year: Patient declined     Ran Out of Food in the Last Year: Patient declined   Physical Activity: Sufficiently Active (10/2/2024)    Exercise Vital Sign     Days of Exercise per Week: 7 days     Minutes of Exercise per Session: 150+ min   Stress: No Stress Concern Present (10/2/2024)    [employer] [facility] [place] [employer] - Occupational Stress Questionnaire     Feeling of Stress : Not at all   Housing Stability: Unknown (10/2/2024)    Housing Stability Vital Sign     Unable to Pay for Housing in the Last Year: No        Allergies:  Review of patient's allergies indicates:   Allergen Reactions    Codeine-cpm-pe-acetaminophen Anaphylaxis     Throat edema    Pcn [penicillins] Swelling        Objective:  Vitals:    01/08/25 0930   BP: 119/83   Pulse: 73   Resp: 20   Temp: 98.2 °F (36.8 °C)     General:  Well developed, well nourished adult female in no acute distress  Abdomen: Soft, nontender, no masses  Extremities:  No clubbing, cyanosis, or edema  Neurologic:  Grossly intact  Musculoskeletal:  Normal tone    Assessment:  1. Asymptomatic microscopic hematuria    2. Urge incontinence  - POCT URINE DIPSTICK WITH MICROSCOPE, AUTOMATED    3. Stress incontinence    4. History of kidney stones     Plan:  - No longer has urinary complaints since hysterectomy in August 2024  - Continue Myrbetriq and oxybutynin if needed. Call clinic for worsening of  symptoms.  - Will need urine cytology at next visit    Return appointment:  6 months with cytology    Shree Madison MD  [facility] PGY-III

## 2025-01-09 NOTE — PROGRESS NOTES
I saw and evaluated the patient with the resident.  I discussed with the resident and agree with the resident's history, physical, assessment, findings and care plan as documented in the resident's note.        Светлана Davis DO  Urology  [facility]

## 2025-05-23 NOTE — PROGRESS NOTES
CC:  Follow-up    HPI:  Joan Frias is a 43 y.o. female here for follow-up.  She has a history of microscopic hematuria. She had a cystoscopy in October of 2023 which was negative and a CT scan in September of 2023 which was negative.  She has smoked 1 1/2 to 2 packs per day for 20 years.  She did have one episode of kidney stones when she was in her 20s which she passed on her own.  The CT scan in September did not show any stones.  Over the last few days she has had pain in the right flank which radiates to the right abdomen.    She complains of stress urinary incontinence with sneeze, laugh, cough or even walking fast.  She also has intermittent episodes of urge incontinence.  She has been on Myrbetriq and Oxybutynin 10 mg. which helps but over the last few months she has had episodes of loss of urine at night.  She is having back problems and is in physical therapy for that.      Bladder scan residual:  42 ml    Urinalysis:    Results for orders placed or performed in visit on 07/08/24   POCT URINE DIPSTICK WITH MICROSCOPE, AUTOMATED   Result Value Ref Range    Color, UA Yellow     Spec Grav UA 1.015     pH, UA 7.5     WBC, UA neg     Nitrite, UA neg     Protein, POC neg     Glucose, UA neg     Ketones, UA neg     Urobilinogen, UA 0.2     Bilirubin, POC neg     Blood, UA neg      Microscopic Urinalysis:  WBC:   None per HPF     RBC:    None per HPF     Bacteria:    None per HPF     Squamous epithelial cells:    None per HPF      Crystals:   None    ROS:  All systems reviewed and are negative except as documented in HPI and/or Assessment and Plan.     Patient Active Problem List:     Patient Active Problem List   Diagnosis    Hematochezia    Diarrhea    Abdominal pain    Tobacco user    Fibromyalgia    Abnormal uterine bleeding (AUB)    Endometriosis    Urinary incontinence, mixed    Cervical cancer screening        Past Medical History:  Past Medical History:   Diagnosis Date    Degenerative lumbar disc      Diarrhea 09/20/2023    Endometriosis, unspecified     Fibromyalgia     Mixed incontinence         Past Surgical History:  Past Surgical History:   Procedure Laterality Date    ABSCESS DRAINAGE  2019    COLONOSCOPY N/A 04/03/2024    Procedure: COLONOSCOPY;  Surgeon: Tuyet Guillen MD;  Location: [facility] ENDOSCOPY;  Service: Gastroenterology;  Laterality: N/A;    COLONOSCOPY, WITH POLYPECTOMY USING SNARE  04/03/2024    Procedure: COLONOSCOPY, WITH POLYPECTOMY USING SNARE;  Surgeon: Tuyet Guillen MD;  Location: [facility] ENDOSCOPY;  Service: Gastroenterology;;    CYST REMOVAL      TUBAL LIGATION          Family History:  Family History   Problem Relation Name Age of Onset    Hypertension Mother Rhina linda     Hyperlipidemia Mother Rhina linda     Stroke Mother Rhina linda     Uterine cancer Sister      Ulcerative colitis Paternal Uncle Franco, Juan     Heart attack Maternal Grandfather      Cancer Maternal Grandfather      Cystic fibrosis Paternal Grandmother Fauzia linda     COPD Paternal Grandmother Fauzia linda     Heart attack Paternal Grandfather      Diabetes Paternal Grandfather          Social History:  Social History     Socioeconomic History    Marital status: Legally    Tobacco Use    Smoking status: Every Day     Current packs/day: 1.50     Average packs/day: 1.5 packs/day for 26.4 years (39.6 ttl pk-yrs)     Types: Cigarettes     Start date: 2/1/1998    Smokeless tobacco: Never   Substance and Sexual Activity    Alcohol use: Not Currently    Drug use: Yes     Frequency: 7.0 times per week     Types: Marijuana     Comment: daily    Sexual activity: Yes     Partners: Male     Birth control/protection: None        Allergies:  Review of patient's allergies indicates:   Allergen Reactions    Codeine-cpm-pe-acetaminophen Anaphylaxis     Throat edema    Pcn [penicillins] Swelling        Objective:  Vitals:    07/08/24 0836   BP: 130/85   Pulse: 88   Temp: 98.4 °F (36.9 °C)     General:  Well  developed, well nourished adult female in no acute distress  Abdomen: Soft, nontender, no masses  Extremities:  No clubbing, cyanosis, or edema  Neurologic:  Grossly intact  Musculoskeletal:  Normal tone    Assessment:  1. Asymptomatic microscopic hematuria  - POCT URINE DIPSTICK WITH MICROSCOPE, AUTOMATED  - POCT Bladder Scan  - Cytology, Urine    2. History of kidney stones    3. Urge incontinence  - Ambulatory referral/consult to Physical/Occupational Therapy; Future    4. Stress incontinence  - Ambulatory referral/consult to Physical/Occupational Therapy; Future       Plan:  Urine cytology today.  The pain that she has having in the right flank does not sound entirely consistent with kidney stones however if this persists she will call and we can order a stone protocol CT to confirm that she has not passing a stone.  Continue Myrbetriq and oxybutynin.  I have sent a another physical therapy referral.  I did discuss Botox with her briefly.  This would be the next step if physical therapy is not effective.  Physical therapy.    Follow-up:    Six months for bladder scan.         no

## 2025-07-30 ENCOUNTER — OFFICE VISIT (OUTPATIENT)
Dept: UROLOGY | Facility: CLINIC | Age: 45
End: 2025-07-30

## 2025-07-30 VITALS
SYSTOLIC BLOOD PRESSURE: 122 MMHG | OXYGEN SATURATION: 96 % | HEART RATE: 85 BPM | TEMPERATURE: 98 F | DIASTOLIC BLOOD PRESSURE: 83 MMHG | RESPIRATION RATE: 19 BRPM | HEIGHT: 68 IN | BODY MASS INDEX: 39.1 KG/M2 | WEIGHT: 258 LBS

## 2025-07-30 DIAGNOSIS — N39.41 URGE INCONTINENCE: ICD-10-CM

## 2025-07-30 DIAGNOSIS — R31.21 ASYMPTOMATIC MICROSCOPIC HEMATURIA: ICD-10-CM

## 2025-07-30 DIAGNOSIS — Z87.442 HISTORY OF KIDNEY STONES: Primary | ICD-10-CM

## 2025-07-30 DIAGNOSIS — N39.3 STRESS INCONTINENCE: ICD-10-CM

## 2025-07-30 LAB
BILIRUB SERPL-MCNC: NORMAL MG/DL
BLOOD URINE, POC: NORMAL
COLOR, POC UA: YELLOW
GLUCOSE UR QL STRIP: NORMAL
KETONES UR QL STRIP: NORMAL
LEUKOCYTE ESTERASE URINE, POC: NORMAL
NITRITE, POC UA: NORMAL
PH, POC UA: 7
PROTEIN, POC: NORMAL
SPECIFIC GRAVITY, POC UA: 1.01
UROBILINOGEN, POC UA: 0.2

## 2025-07-30 PROCEDURE — 99215 OFFICE O/P EST HI 40 MIN: CPT | Mod: PBBFAC | Performed by: UROLOGY

## 2025-07-30 PROCEDURE — 81001 URINALYSIS AUTO W/SCOPE: CPT | Mod: PBBFAC | Performed by: UROLOGY

## 2025-07-30 PROCEDURE — 88108 CYTOPATH CONCENTRATE TECH: CPT | Mod: TC | Performed by: UROLOGY

## 2025-07-30 NOTE — PROGRESS NOTES
CC:    Follow-up    HPI:  Joan Frias is a 44 y.o. female here for follow-up.  She has a history of microscopic hematuria. Cystoscopy in October 2023 was negative and CT scan in September 2023 was negative.  She has smoked 1 1/2 to 2 packs per day for 20 years.  She did have one episode of kidney stones when she was in her 20s which she passed on her own.  The CT scan in September 2023 did not show any stones.  She has had bilateral low back pain for five days.    She previously had stress urinary incontinence with sneeze, laugh, cough or even walking fast. This resolved with the hysterectomy in August 2024.    She also has intermittent episodes of urge incontinence.  She has been on Myrbetriq and Oxybutynin 10 mg but stopped taking that after the hysterectomy and the symptoms have not returned.     Urinalysis:    Results for orders placed or performed in visit on 07/30/25   POCT URINE DIPSTICK WITH MICROSCOPE, AUTOMATED   Result Value Ref Range    Color, UA Yellow     Spec Grav UA 1.015     pH, UA 7.0     WBC, UA neg     Nitrite, UA neg     Protein, POC neg     Glucose, UA neg     Ketones, UA neg     Urobilinogen, UA 0.2     Bilirubin, POC neg     Blood, UA neg      Microscopic Urinalysis:  WBC:   None per HPF     RBC:    None per HPF     Bacteria:    None per HPF     Squamous epithelial cells:  None per HPF      Crystals:   None    ROS:  All systems reviewed and are negative except as documented in HPI and/or Assessment and Plan.     Patient Active Problem List:     Problem List[1]     Past Medical History:  Past Medical History:   Diagnosis Date    Abnormal uterine bleeding     Degenerative lumbar disc     Diarrhea 09/20/2023    Endometriosis, unspecified     Fibromyalgia     Mixed incontinence         Past Surgical History:  Past Surgical History:   Procedure Laterality Date    ABSCESS DRAINAGE  2019    BILATERAL SALPINGO-OOPHORECTOMY (BSO) Bilateral 08/15/2024    Procedure: SALPINGO-OOPHORECTOMY, BILATERAL;   Surgeon: Diana Méndez MD;  Location: [facility] OR;  Service: OB/GYN;  Laterality: Bilateral;    COLONOSCOPY N/A 04/03/2024    Procedure: COLONOSCOPY;  Surgeon: Tuyet Guillen MD;  Location: [facility] ENDOSCOPY;  Service: Gastroenterology;  Laterality: N/A;    COLONOSCOPY, WITH POLYPECTOMY USING SNARE  04/03/2024    Procedure: COLONOSCOPY, WITH POLYPECTOMY USING SNARE;  Surgeon: Tuyet Guillen MD;  Location: [facility] ENDOSCOPY;  Service: Gastroenterology;;    CYST REMOVAL      CYSTOSCOPY N/A 08/15/2024    Procedure: CYSTOSCOPY;  Surgeon: Diana Méndez MD;  Location: [facility] OR;  Service: OB/GYN;  Laterality: N/A;    HYSTERECTOMY      LAPAROSCOPIC TOTAL HYSTERECTOMY N/A 08/15/2024    Procedure: HYSTERECTOMY, TOTAL, LAPAROSCOPIC;  Surgeon: Diana Méndez MD;  Location: [facility] OR;  Service: OB/GYN;  Laterality: N/A;    SURGICAL REMOVAL OF ENDOMETRIOSIS N/A 08/15/2024    Procedure: DESTRUCTION, ENDOMETRIOSIS;  Surgeon: Diana Méndez MD;  Location: [facility] OR;  Service: OB/GYN;  Laterality: N/A;    TUBAL LIGATION          Family History:  Family History   Problem Relation Name Age of Onset    Hypertension Mother La Nena     Hyperlipidemia Mother La Nena     Stroke Mother La Nena     Uterine cancer Sister      Ulcerative colitis Paternal Uncle Franco, Juan     Heart attack Maternal Grandfather      Cancer Maternal Grandfather      Cystic fibrosis Paternal Grandmother Fauzia linda     COPD Paternal Grandmother Fauzia linda     Heart attack Paternal Grandfather Bennett / Reuben     Diabetes Paternal Grandfather Bennett / Reuben         Social History:  Social History     Socioeconomic History    Marital status: Legally    Tobacco Use    Smoking status: Former     Current packs/day: 1.50     Average packs/day: 1.5 packs/day for 27.5 years (41.2 ttl pk-yrs)     Types: Cigarettes     Start date: 2/1/1998    Smokeless tobacco: Never   Substance and Sexual Activity    Alcohol use: Never    Drug use: Not Currently      Frequency: 7.0 times per week     Types: Marijuana     Comment: daily    Sexual activity: Yes     Partners: Male     Birth control/protection: None     Social Drivers of Health     Financial Resource Strain: Patient Declined (10/2/2024)    Overall Financial Resource Strain (CARDIA)     Difficulty of Paying Living Expenses: Patient declined   Food Insecurity: Patient Declined (10/2/2024)    Hunger Vital Sign     Worried About Running Out of Food in the Last Year: Patient declined     Ran Out of Food in the Last Year: Patient declined   Physical Activity: Sufficiently Active (10/2/2024)    Exercise Vital Sign     Days of Exercise per Week: 7 days     Minutes of Exercise per Session: 150+ min   Stress: No Stress Concern Present (10/2/2024)    [place] [employer] [place] [employer] - Occupational Stress Questionnaire     Feeling of Stress : Not at all   Housing Stability: Unknown (10/2/2024)    Housing Stability Vital Sign     Unable to Pay for Housing in the Last Year: No        Allergies:  Review of patient's allergies indicates:   Allergen Reactions    Codeine-cpm-pe-acetaminophen Anaphylaxis     Throat edema    Pcn [penicillins] Swelling        Objective:  Vitals:    07/30/25 0919   BP: 122/83   Pulse: 85   Resp: 19   Temp: 98.3 °F (36.8 °C)     General:  Well developed, well nourished adult female in no acute distress  Abdomen: Soft, nontender, no masses  Extremities:  No clubbing, cyanosis, or edema  Neurologic:  Grossly intact  Musculoskeletal:  Normal tone    Assessment:  1. History of kidney stones  - US Retroperitoneal Limited; Future    2. Asymptomatic microscopic hematuria  - POCT URINE DIPSTICK WITH MICROSCOPE, AUTOMATED  - Cytology, Urine    3. Urge incontinence    4. Stress incontinence     Plan:  I am going to do a retroperitoneal ultrasound to evaluate for occurrence of kidney stones especially in light of the back pain that she has developed.  Urine cytology was sent today.  The urge incontinence has  resolved since her hysterectomy.  Stress incontinence has resolved since her hysterectomy.    Follow-up tests needed:   None     Return appointment:  One year for cytology.               [1]   Patient Active Problem List  Diagnosis    Hematochezia    Diarrhea    Abdominal pain    Tobacco user    Fibromyalgia    Abnormal uterine bleeding (AUB)    Endometriosis    Urinary incontinence, mixed    Cervical cancer screening    Adenomyosis    Postop check

## 2025-08-01 LAB
ESTROGEN SERPL-MCNC: NORMAL PG/ML
INSULIN SERPL-ACNC: NORMAL U[IU]/ML
LAB AP CLINICAL INFORMATION: NORMAL
LAB AP GROSS DESCRIPTION: NORMAL
LAB AP URINE CYTOLOGY INTERPRETATION SPECIMEN 1: NORMAL

## (undated) DEVICE — MANIFOLD 4 PORT

## (undated) DEVICE — SOLIDIFIER BOTTLE 1500CC

## (undated) DEVICE — SOL NORMAL USPCA 0.9%

## (undated) DEVICE — SNARE EXACTO COLD

## (undated) DEVICE — JELLY SURGILUBE LUBE PKT 3GM

## (undated) DEVICE — SOL IRRI STRL WATER 1000ML

## (undated) DEVICE — GLOVE SENSICARE PI GRN 8

## (undated) DEVICE — DRAPE UNDERBUTTOCKS PCH STRL

## (undated) DEVICE — TOWEL OR DISP STRL BLUE 4/PK

## (undated) DEVICE — DRAPE LEGGINGS CUFF 31X48IN

## (undated) DEVICE — SUT VICRYL+ 27 UR-6 VIOL

## (undated) DEVICE — KIT SURGICAL TURNOVER

## (undated) DEVICE — SLEEVE KII ADV FIX 5X100MM

## (undated) DEVICE — GLOVE SIGNATURE MICRO LTX 7

## (undated) DEVICE — SET CYSTO IRR DRP CHMBR 84IN

## (undated) DEVICE — GLOVE SENSICARE PI GRN 6.5

## (undated) DEVICE — GLOVE SENSICARE PI GRN 7.5

## (undated) DEVICE — SEALER TISSUE ENSEAL X1 37CM

## (undated) DEVICE — GLOVE SENSICARE NEOPRENE 6.5

## (undated) DEVICE — TRAY SKIN SCRUB WET PREMIUM

## (undated) DEVICE — TIP RUMI GREEN DISPOSABLE6.7MM

## (undated) DEVICE — SYR 50CC LL

## (undated) DEVICE — SOL NACL IRR 3000ML

## (undated) DEVICE — SCRUB DYNA-HEX LIQ 4% CHG 4OZ

## (undated) DEVICE — NDL HYPO REG 25G X 1 1/2

## (undated) DEVICE — TROCAR KII FIOS 5MM X 100MM

## (undated) DEVICE — IRRIGATOR SUCTION W/TIP

## (undated) DEVICE — ADHESIVE DERMABOND ADVANCED

## (undated) DEVICE — TRAP ETRAP POLYP 50 TRAY

## (undated) DEVICE — KIT VUETIP TROCAR SWAB

## (undated) DEVICE — KIT LAPAROSCOPY UNIVERSITY

## (undated) DEVICE — PAD PINK TRENDELENBURG POS XL

## (undated) DEVICE — KIT SURGICAL COLON .25 1.1OZ

## (undated) DEVICE — GLOVE SIGNATURE ESSNTL LTX 7.5

## (undated) DEVICE — Device

## (undated) DEVICE — HEMOSTAT SURGICEL PWD 3G

## (undated) DEVICE — PACK SURG LITHOTOMY 3 SIRUS

## (undated) DEVICE — DEVICE CLSR PDS 0 CT-1 12IN

## (undated) DEVICE — TIP RUMI BLUE DISPOSABLE 5/BX

## (undated) DEVICE — APPLICATOR SURGICEL ENDOSCOPIC

## (undated) DEVICE — SYR 10CC LUER LOCK

## (undated) DEVICE — PAD PREP CUFFED NS 24X48IN

## (undated) DEVICE — GLOVE PROTEXIS LTX MICRO  7

## (undated) DEVICE — TRAY CATH FOL SIL URIMTR 16FR

## (undated) DEVICE — APPLICATOR CHLORAPREP ORN 26ML

## (undated) DEVICE — SUT MCRYL PLUS 4-0 PS2 27IN

## (undated) DEVICE — TUBING INSUFFLATOR W/ROT CONCT

## (undated) DEVICE — GLOVE SIGNATURE ESSNTL LTX 6.5

## (undated) DEVICE — ELECTRODE PATIENT RETURN DISP

## (undated) DEVICE — GLOVE SIGNATURE MICRO LTX 6.5

## (undated) DEVICE — BINDER ABDOMINAL UNIV XLN 10IN